# Patient Record
Sex: FEMALE | Race: WHITE | Employment: FULL TIME | URBAN - METROPOLITAN AREA
[De-identification: names, ages, dates, MRNs, and addresses within clinical notes are randomized per-mention and may not be internally consistent; named-entity substitution may affect disease eponyms.]

---

## 2017-01-02 NOTE — TELEPHONE ENCOUNTER
LOV  11/12/2016    Last refill    lisinopril 20 MG Oral Tab 90 tablet 0 9/1/2016       Sig :  Take 1 tablet (20 mg total) by mouth daily.       Route:   Oral       Medication pending. Please advise. No future appointments.

## 2017-01-03 RX ORDER — LISINOPRIL 20 MG/1
TABLET ORAL
Qty: 30 TABLET | Refills: 0 | Status: SHIPPED | OUTPATIENT
Start: 2017-01-03 | End: 2017-03-25

## 2017-01-10 ENCOUNTER — MED REC SCAN ONLY (OUTPATIENT)
Dept: FAMILY MEDICINE CLINIC | Facility: CLINIC | Age: 42
End: 2017-01-10

## 2017-02-03 RX ORDER — LISINOPRIL 20 MG/1
TABLET ORAL
Qty: 30 TABLET | Refills: 0 | Status: SHIPPED | OUTPATIENT
Start: 2017-02-03 | End: 2017-03-25

## 2017-02-03 NOTE — TELEPHONE ENCOUNTER
LOV: 11/12/16 for ADHD  BP at time of visit: 98/60      LISINOPRIL 20 MG Oral Tab 30 tablet 0 1/3/2017      Sig :  TAKE 1 TABLET(20 MG) BY MOUTH DAILY       Route:   (none)       No future appointments. Please advise.

## 2017-02-22 ENCOUNTER — MED REC SCAN ONLY (OUTPATIENT)
Dept: FAMILY MEDICINE CLINIC | Facility: CLINIC | Age: 42
End: 2017-02-22

## 2017-03-25 ENCOUNTER — MED REC SCAN ONLY (OUTPATIENT)
Dept: FAMILY MEDICINE CLINIC | Facility: CLINIC | Age: 42
End: 2017-03-25

## 2017-03-25 ENCOUNTER — OFFICE VISIT (OUTPATIENT)
Dept: FAMILY MEDICINE CLINIC | Facility: CLINIC | Age: 42
End: 2017-03-25

## 2017-03-25 VITALS
WEIGHT: 293 LBS | SYSTOLIC BLOOD PRESSURE: 108 MMHG | RESPIRATION RATE: 20 BRPM | TEMPERATURE: 98 F | BODY MASS INDEX: 53 KG/M2 | DIASTOLIC BLOOD PRESSURE: 62 MMHG | HEART RATE: 111 BPM

## 2017-03-25 DIAGNOSIS — M54.42 CHRONIC LEFT-SIDED LOW BACK PAIN WITH LEFT-SIDED SCIATICA: Primary | ICD-10-CM

## 2017-03-25 DIAGNOSIS — I10 ESSENTIAL HYPERTENSION: ICD-10-CM

## 2017-03-25 DIAGNOSIS — G89.29 CHRONIC LEFT-SIDED LOW BACK PAIN WITH LEFT-SIDED SCIATICA: Primary | ICD-10-CM

## 2017-03-25 LAB
BUN BLD-MCNC: 11 MG/DL (ref 8–20)
CALCIUM BLD-MCNC: 9.4 MG/DL (ref 8.3–10.3)
CHLORIDE: 106 MMOL/L (ref 101–111)
CO2: 24 MMOL/L (ref 22–32)
CREAT BLD-MCNC: 0.75 MG/DL (ref 0.55–1.02)
GLUCOSE BLD-MCNC: 127 MG/DL (ref 70–99)
POTASSIUM SERPL-SCNC: 4.2 MMOL/L (ref 3.6–5.1)
SODIUM SERPL-SCNC: 138 MMOL/L (ref 136–144)

## 2017-03-25 PROCEDURE — 99214 OFFICE O/P EST MOD 30 MIN: CPT | Performed by: FAMILY MEDICINE

## 2017-03-25 PROCEDURE — 80048 BASIC METABOLIC PNL TOTAL CA: CPT | Performed by: FAMILY MEDICINE

## 2017-03-25 RX ORDER — LISINOPRIL 20 MG/1
TABLET ORAL
Qty: 90 TABLET | Refills: 1 | Status: SHIPPED | OUTPATIENT
Start: 2017-03-25 | End: 2017-03-27

## 2017-03-25 RX ORDER — IBUPROFEN 800 MG/1
TABLET ORAL
Qty: 90 TABLET | Refills: 0 | Status: SHIPPED | OUTPATIENT
Start: 2017-03-25 | End: 2017-03-27

## 2017-03-25 NOTE — PROGRESS NOTES
CC: meds check    HPI:     HTN:   Quality essential  Severity moderate  Duration chronic  Modifying factors lisinopril controlling    Back pain:   Location lower, left  Quality aching, sharp, sciatic  Severity mdoerate  Duration chronic  Modifying factors active. Encouraged TLC for weight loss. Prn ibuprofen. No orders of the defined types were placed in this encounter.        Meds & Refills for this Visit:  Signed Prescriptions Disp Refills    lisinopril 20 MG Oral Tab 90 tablet 1      Sig: TAKE 1 TAB

## 2017-05-05 ENCOUNTER — OFFICE VISIT (OUTPATIENT)
Dept: FAMILY MEDICINE CLINIC | Facility: CLINIC | Age: 42
End: 2017-05-05

## 2017-05-05 VITALS
HEART RATE: 104 BPM | SYSTOLIC BLOOD PRESSURE: 118 MMHG | WEIGHT: 293 LBS | TEMPERATURE: 99 F | RESPIRATION RATE: 20 BRPM | HEIGHT: 64 IN | BODY MASS INDEX: 50.02 KG/M2 | DIASTOLIC BLOOD PRESSURE: 64 MMHG

## 2017-05-05 DIAGNOSIS — H61.23 BILATERAL IMPACTED CERUMEN: Primary | ICD-10-CM

## 2017-05-05 PROCEDURE — 99213 OFFICE O/P EST LOW 20 MIN: CPT | Performed by: FAMILY MEDICINE

## 2017-05-05 NOTE — PROGRESS NOTES
CC: hearing change    HPI:     Left ear blocked.      ROS:; no otorrhea    EXAM:   /64 mmHg  Pulse 104  Temp(Src) 98.9 °F (37.2 °C) (Temporal)  Resp 20  Ht 64\"  Wt 313 lb  BMI 53.70 kg/m2  LMP 04/19/2017 (Approximate)    ENT: tms impacted bilaterally

## 2017-05-06 ENCOUNTER — NURSE ONLY (OUTPATIENT)
Dept: FAMILY MEDICINE CLINIC | Facility: CLINIC | Age: 42
End: 2017-05-06

## 2017-10-13 NOTE — TELEPHONE ENCOUNTER
LOV: 5/5/17 for bilateral impacted cerumen  /64 mmHg     lisinopril 20 MG Oral Tab 90 tablet 1 3/27/2017    Sig :  TAKE 1 TABLET(20 MG) BY MOUTH DAILY     Route:   (none)     Cosign for Ordering:   Accepted by Carin Dubin, DO on 3/31/2017  1:51 PM

## 2017-10-13 NOTE — TELEPHONE ENCOUNTER
From: Silvio Bonilla  Sent: 10/13/2017 4:06 PM CDT  Subject: Medication Renewal Request    Corinne Alvarez would like a refill of the following medications:     lisinopril 20 MG Oral Tab Norberto Chacon DO]   Patient Comment: I am almost out of this m

## 2017-10-16 ENCOUNTER — PATIENT MESSAGE (OUTPATIENT)
Dept: FAMILY MEDICINE CLINIC | Facility: CLINIC | Age: 42
End: 2017-10-16

## 2017-10-16 DIAGNOSIS — Z00.00 GENERAL MEDICAL EXAM: Primary | ICD-10-CM

## 2017-10-16 RX ORDER — LISINOPRIL 20 MG/1
TABLET ORAL
Qty: 90 TABLET | Refills: 1 | Status: SHIPPED
Start: 2017-10-16 | End: 2017-12-26

## 2017-10-16 RX ORDER — IBUPROFEN 800 MG/1
TABLET ORAL
Qty: 90 TABLET | Refills: 0 | Status: SHIPPED
Start: 2017-10-16 | End: 2017-12-26

## 2017-10-16 NOTE — TELEPHONE ENCOUNTER
From: Justus Sultana  To: Ivy Feldman DO  Sent: 10/16/2017 2:33 PM CDT  Subject: Other    I would like to schedule an annual physical for myself and I want to do full blood work to see how things are going as compared to past years.  I would like to

## 2017-11-13 ENCOUNTER — OFFICE VISIT (OUTPATIENT)
Dept: FAMILY MEDICINE CLINIC | Facility: CLINIC | Age: 42
End: 2017-11-13

## 2017-11-13 VITALS
RESPIRATION RATE: 24 BRPM | HEIGHT: 63 IN | WEIGHT: 293 LBS | SYSTOLIC BLOOD PRESSURE: 128 MMHG | TEMPERATURE: 98 F | DIASTOLIC BLOOD PRESSURE: 78 MMHG | HEART RATE: 104 BPM | BODY MASS INDEX: 51.91 KG/M2

## 2017-11-13 DIAGNOSIS — Z00.00 GENERAL MEDICAL EXAM: Primary | ICD-10-CM

## 2017-11-13 PROCEDURE — 99396 PREV VISIT EST AGE 40-64: CPT | Performed by: FAMILY MEDICINE

## 2017-11-13 RX ORDER — METRONIDAZOLE 10 MG/G
GEL TOPICAL
Qty: 60 G | Refills: 5 | Status: SHIPPED | OUTPATIENT
Start: 2017-11-13 | End: 2017-12-26

## 2017-11-14 NOTE — PROGRESS NOTES
HPI:   Amor Rodriguez is a 40year old female who presents for a complete physical exam. Sees gynecology for all breast and  care. There is no immunization history on file for this patient.   Wt Readings from Last 6 Encounters:  11/13/17 : Ihsaan Serna HYPERTENSION    • Other malaise and fatigue 1/10/2011   • Other symptoms referable to back 1/3/2011   • Premenstrual tension syndromes 5/9/2011   • Sleep apnea    • Sprain of lumbar region 1/3/2011   • Unspecified essential hypertension     in pregnancy tenderness  EXTREMITIES: no cyanosis, clubbing or edema    ASSESSMENT AND PLAN:   Sandra Olivia is a 40year old female who presents for a complete physical exam. Pt' s weight is Body mass index is 57.57 kg/m². , recommended low sugar diet and aerobic

## 2017-12-26 ENCOUNTER — OFFICE VISIT (OUTPATIENT)
Dept: FAMILY MEDICINE CLINIC | Facility: CLINIC | Age: 42
End: 2017-12-26

## 2017-12-26 VITALS
BODY MASS INDEX: 57 KG/M2 | WEIGHT: 293 LBS | RESPIRATION RATE: 20 BRPM | TEMPERATURE: 98 F | HEART RATE: 107 BPM | DIASTOLIC BLOOD PRESSURE: 80 MMHG | OXYGEN SATURATION: 98 % | SYSTOLIC BLOOD PRESSURE: 118 MMHG

## 2017-12-26 DIAGNOSIS — J02.9 SORE THROAT: Primary | ICD-10-CM

## 2017-12-26 PROCEDURE — 87880 STREP A ASSAY W/OPTIC: CPT | Performed by: FAMILY MEDICINE

## 2017-12-26 PROCEDURE — 99213 OFFICE O/P EST LOW 20 MIN: CPT | Performed by: FAMILY MEDICINE

## 2017-12-26 PROCEDURE — 87081 CULTURE SCREEN ONLY: CPT | Performed by: FAMILY MEDICINE

## 2017-12-26 RX ORDER — METRONIDAZOLE 10 MG/G
GEL TOPICAL
Refills: 5 | COMMUNITY
Start: 2017-11-13 | End: 2019-03-12

## 2017-12-26 RX ORDER — LISINOPRIL 20 MG/1
TABLET ORAL
Refills: 1 | COMMUNITY
Start: 2017-10-19 | End: 2018-04-28

## 2017-12-26 RX ORDER — IBUPROFEN 800 MG/1
TABLET ORAL
Refills: 0 | COMMUNITY
Start: 2017-10-19 | End: 2018-04-30

## 2017-12-26 NOTE — PROGRESS NOTES
CC: sore throat    HPI:     Sore throat 1-2 days.      ROS: no fever, some cough      EXAM:   /80   Pulse 107   Temp 97.9 °F (36.6 °C) (Temporal)   Resp 20   Wt (!) 322 lb   LMP 12/26/2017   SpO2 98%   BMI 57.04 kg/m²     H: RRR  L: CTA jessee  ENT; thro

## 2018-01-08 ENCOUNTER — TELEPHONE (OUTPATIENT)
Dept: FAMILY MEDICINE CLINIC | Facility: CLINIC | Age: 43
End: 2018-01-08

## 2018-01-08 NOTE — TELEPHONE ENCOUNTER
Patient advised. Appointment scheduled.   Future Appointments  Date Time Provider Lena Garcia   1/9/2018 10:40 AM DO MAMTA Alonso [facility]

## 2018-01-08 NOTE — TELEPHONE ENCOUNTER
Patient seen 12/26/17 for sore throat. Strep culture came back negative  Patient's symptoms started after rachael. Patient states she was feeling better but on Saturday patient felt worse.   Patient has been having a low grade fever (99.9) - patient pema

## 2018-01-08 NOTE — TELEPHONE ENCOUNTER
Pt was seen after xmas for cold and strep,  now has a low grade fever,  chills, congestion, sore throat, yellow mucus,  Wants to know if she can get antibiotic called in or does she have to be seen. Schedule is full.

## 2018-01-09 ENCOUNTER — OFFICE VISIT (OUTPATIENT)
Dept: FAMILY MEDICINE CLINIC | Facility: CLINIC | Age: 43
End: 2018-01-09

## 2018-01-09 VITALS
HEART RATE: 111 BPM | WEIGHT: 293 LBS | RESPIRATION RATE: 24 BRPM | BODY MASS INDEX: 55 KG/M2 | SYSTOLIC BLOOD PRESSURE: 128 MMHG | OXYGEN SATURATION: 98 % | TEMPERATURE: 99 F | DIASTOLIC BLOOD PRESSURE: 78 MMHG

## 2018-01-09 DIAGNOSIS — J01.10 ACUTE NON-RECURRENT FRONTAL SINUSITIS: Primary | ICD-10-CM

## 2018-01-09 PROCEDURE — 99214 OFFICE O/P EST MOD 30 MIN: CPT | Performed by: FAMILY MEDICINE

## 2018-01-09 RX ORDER — AZITHROMYCIN 250 MG/1
TABLET, FILM COATED ORAL
Qty: 6 TABLET | Refills: 0 | Status: SHIPPED | OUTPATIENT
Start: 2018-01-09 | End: 2018-04-28

## 2018-02-05 PROCEDURE — 88175 CYTOPATH C/V AUTO FLUID REDO: CPT | Performed by: OBSTETRICS & GYNECOLOGY

## 2018-02-05 PROCEDURE — 87624 HPV HI-RISK TYP POOLED RSLT: CPT | Performed by: OBSTETRICS & GYNECOLOGY

## 2018-02-08 ENCOUNTER — PATIENT MESSAGE (OUTPATIENT)
Dept: FAMILY MEDICINE CLINIC | Facility: CLINIC | Age: 43
End: 2018-02-08

## 2018-02-08 RX ORDER — AMLODIPINE BESYLATE 5 MG/1
5 TABLET ORAL DAILY
Qty: 30 TABLET | Refills: 0 | Status: SHIPPED | OUTPATIENT
Start: 2018-02-08 | End: 2018-03-24

## 2018-02-08 NOTE — TELEPHONE ENCOUNTER
From: Mildred Stanley  To: Marissa Beal DO  Sent: 2/8/2018 10:33 AM CST  Subject: Prescription Question    Dr. Daniel Evans,    I wanted to find out if there is a different blood pressure medication you could prescribe for me.  I am not taking my lisinopril d

## 2018-03-24 ENCOUNTER — TELEPHONE (OUTPATIENT)
Dept: FAMILY MEDICINE CLINIC | Facility: CLINIC | Age: 43
End: 2018-03-24

## 2018-03-24 RX ORDER — AMLODIPINE BESYLATE 5 MG/1
5 TABLET ORAL DAILY
Qty: 30 TABLET | Refills: 0 | Status: SHIPPED | OUTPATIENT
Start: 2018-03-24 | End: 2018-04-28

## 2018-03-24 NOTE — TELEPHONE ENCOUNTER
Called patient to reschedule appointment with Dr. Fan Zamarripa on 03/24/2018.   Patient states needs a refill on blood pressure meds before next appointment on 04/28/2018    LOV -  01/09/2018 sinuisitis  LRF- Amlodipine 02/08/2018 #30 RF0  Future Appointments  D

## 2018-04-28 ENCOUNTER — OFFICE VISIT (OUTPATIENT)
Dept: FAMILY MEDICINE CLINIC | Facility: CLINIC | Age: 43
End: 2018-04-28

## 2018-04-28 VITALS
SYSTOLIC BLOOD PRESSURE: 124 MMHG | BODY MASS INDEX: 51.91 KG/M2 | WEIGHT: 293 LBS | HEART RATE: 82 BPM | HEIGHT: 63 IN | DIASTOLIC BLOOD PRESSURE: 84 MMHG | OXYGEN SATURATION: 98 % | TEMPERATURE: 98 F | RESPIRATION RATE: 18 BRPM

## 2018-04-28 DIAGNOSIS — I10 ESSENTIAL HYPERTENSION: Primary | ICD-10-CM

## 2018-04-28 PROCEDURE — 99213 OFFICE O/P EST LOW 20 MIN: CPT | Performed by: FAMILY MEDICINE

## 2018-04-28 RX ORDER — AMLODIPINE BESYLATE 5 MG/1
5 TABLET ORAL DAILY
Qty: 90 TABLET | Refills: 1 | Status: SHIPPED | OUTPATIENT
Start: 2018-04-28 | End: 2019-03-12

## 2018-04-28 NOTE — PROGRESS NOTES
CC: bp check    HPI:    bp stable. Compliant with amlodipine.      ROS; no cp or sob, some edema but resolved to baseline    EXAM: /84   Pulse 82   Temp 97.9 °F (36.6 °C) (Temporal)   Resp 18   Ht 63\"   Wt (!) 322 lb   LMP 04/11/2018   SpO2 98%   BMI

## 2018-04-30 ENCOUNTER — PATIENT MESSAGE (OUTPATIENT)
Dept: FAMILY MEDICINE CLINIC | Facility: CLINIC | Age: 43
End: 2018-04-30

## 2018-04-30 NOTE — TELEPHONE ENCOUNTER
From: Teodoro Randhawa  To: Jesusita Burroughs DO  Sent: 4/30/2018 4:38 PM CDT  Subject: Prescription Question    I see that the Amoldipine was sent over to my mail order service, however I don't see that the prescription refill for ibuprofen was sent.  Can you

## 2018-05-01 RX ORDER — IBUPROFEN 800 MG/1
TABLET ORAL
Qty: 90 TABLET | Refills: 0 | Status: SHIPPED | OUTPATIENT
Start: 2018-05-01 | End: 2018-05-02

## 2018-05-02 ENCOUNTER — TELEPHONE (OUTPATIENT)
Dept: FAMILY MEDICINE CLINIC | Facility: CLINIC | Age: 43
End: 2018-05-02

## 2018-05-02 ENCOUNTER — PATIENT MESSAGE (OUTPATIENT)
Dept: FAMILY MEDICINE CLINIC | Facility: CLINIC | Age: 43
End: 2018-05-02

## 2018-05-02 RX ORDER — IBUPROFEN 800 MG/1
TABLET ORAL
Qty: 90 TABLET | Refills: 0 | Status: SHIPPED | OUTPATIENT
Start: 2018-05-02 | End: 2018-05-02

## 2018-05-02 NOTE — TELEPHONE ENCOUNTER
[address] [facility] states pt needs new RX for  ibuprofen 800 MG Oral Tab 90 tablet 0 5/2/2018    Sig :  TK 1 T PO TID PRN P       Due to pt's insurance covers 60 days minimum. FYI  Medication was prescribe today. Please advise.

## 2018-05-02 NOTE — TELEPHONE ENCOUNTER
From: Ambika Spears  To: Neil Palma DO  Sent: 5/2/2018 9:06 AM CDT  Subject: Prescription Question    The ibuprofen refill was sent to [facility] on 5/1/18 but should have been sent to [address] [place] [address] as this is my new mail order service.  [facility] is no long

## 2018-05-03 RX ORDER — IBUPROFEN 800 MG/1
TABLET ORAL
Qty: 180 TABLET | Refills: 0 | Status: SHIPPED | OUTPATIENT
Start: 2018-05-03 | End: 2019-09-16

## 2018-08-06 ENCOUNTER — OFFICE VISIT (OUTPATIENT)
Dept: FAMILY MEDICINE CLINIC | Facility: CLINIC | Age: 43
End: 2018-08-06
Payer: COMMERCIAL

## 2018-08-06 VITALS
BODY MASS INDEX: 51.91 KG/M2 | WEIGHT: 293 LBS | HEIGHT: 63 IN | DIASTOLIC BLOOD PRESSURE: 86 MMHG | OXYGEN SATURATION: 96 % | HEART RATE: 94 BPM | TEMPERATURE: 99 F | SYSTOLIC BLOOD PRESSURE: 124 MMHG | RESPIRATION RATE: 18 BRPM

## 2018-08-06 DIAGNOSIS — J01.00 ACUTE NON-RECURRENT MAXILLARY SINUSITIS: Primary | ICD-10-CM

## 2018-08-06 PROCEDURE — 99214 OFFICE O/P EST MOD 30 MIN: CPT | Performed by: FAMILY MEDICINE

## 2018-08-06 RX ORDER — AZITHROMYCIN 250 MG/1
TABLET, FILM COATED ORAL
Qty: 6 TABLET | Refills: 0 | Status: SHIPPED | OUTPATIENT
Start: 2018-08-06 | End: 2019-01-10

## 2018-08-06 NOTE — PROGRESS NOTES
CC: congestion    HPI:     Location chest, nasal  Quality pressure, drainage  Severity moderate  Duration couple weeks  Associated symptoms some cough    ROS:   Pos post nasal drip, no vomiting or diarrhea, no rash    Past Medical History:   Diagnosis Date Consults:  None

## 2018-08-13 NOTE — PROGRESS NOTES
CC: congestion    HPI:     Location frontal  Quality pressure, drainage  Severity moderate  Duration several days   Timing frequent  Context had sore throat prior, then better a few days, then fever and above symptoms started    ROS: :some cough, pos tooth Called cell#, sounded like someone answered but line went dead.    Called cell#, is feeling like \"something is there\" - having difficulty starting urinary stream at night, but having the feeling of going. Also was having burning at urethra. Continued through the weekend. Is sensitive to  Bactrim DS, original prescription that  was going to prescribe. Sent to pharmacy. Only allergy on file is Macrobid.   Imaging & Consults:  None

## 2019-01-10 ENCOUNTER — OFFICE VISIT (OUTPATIENT)
Dept: FAMILY MEDICINE CLINIC | Facility: CLINIC | Age: 44
End: 2019-01-10
Payer: COMMERCIAL

## 2019-01-10 VITALS
SYSTOLIC BLOOD PRESSURE: 132 MMHG | BODY MASS INDEX: 51.91 KG/M2 | HEART RATE: 88 BPM | WEIGHT: 293 LBS | HEIGHT: 63 IN | TEMPERATURE: 99 F | OXYGEN SATURATION: 95 % | DIASTOLIC BLOOD PRESSURE: 86 MMHG | RESPIRATION RATE: 20 BRPM

## 2019-01-10 DIAGNOSIS — I10 ESSENTIAL HYPERTENSION: ICD-10-CM

## 2019-01-10 DIAGNOSIS — J01.01 ACUTE RECURRENT MAXILLARY SINUSITIS: Primary | ICD-10-CM

## 2019-01-10 PROCEDURE — 99214 OFFICE O/P EST MOD 30 MIN: CPT | Performed by: FAMILY MEDICINE

## 2019-01-10 RX ORDER — AZITHROMYCIN 250 MG/1
TABLET, FILM COATED ORAL
Qty: 6 TABLET | Refills: 0 | Status: SHIPPED | OUTPATIENT
Start: 2019-01-10 | End: 2019-03-12

## 2019-01-10 NOTE — PROGRESS NOTES
CC: congestion    HPI:    Location nasal, sinus  Quality pressure, drainage  Severity moderate  Duration several days    Htn: chronic, stable. She went off amlodipine several weeks ago, citing \"wheezing every time I took it\".      ROS:   GENERAL HEALTH: f better following treatment, return for re-evaluation. 2. Essential hypertension  Follow up rn visit in 2 weeks for bp check and ear flush. Consider other agent. No orders of the defined types were placed in this encounter.       Meds & Refills fo

## 2019-01-26 ENCOUNTER — NURSE ONLY (OUTPATIENT)
Dept: FAMILY MEDICINE CLINIC | Facility: CLINIC | Age: 44
End: 2019-01-26
Payer: COMMERCIAL

## 2019-01-26 VITALS — DIASTOLIC BLOOD PRESSURE: 88 MMHG | SYSTOLIC BLOOD PRESSURE: 132 MMHG

## 2019-02-09 ENCOUNTER — NURSE ONLY (OUTPATIENT)
Dept: FAMILY MEDICINE CLINIC | Facility: CLINIC | Age: 44
End: 2019-02-09
Payer: COMMERCIAL

## 2019-02-09 VITALS — HEART RATE: 86 BPM | DIASTOLIC BLOOD PRESSURE: 83 MMHG | SYSTOLIC BLOOD PRESSURE: 137 MMHG

## 2019-03-12 PROCEDURE — 87624 HPV HI-RISK TYP POOLED RSLT: CPT | Performed by: OBSTETRICS & GYNECOLOGY

## 2019-03-12 PROCEDURE — 88175 CYTOPATH C/V AUTO FLUID REDO: CPT | Performed by: OBSTETRICS & GYNECOLOGY

## 2019-03-23 ENCOUNTER — LAB ENCOUNTER (OUTPATIENT)
Dept: LAB | Age: 44
End: 2019-03-23
Attending: FAMILY MEDICINE
Payer: COMMERCIAL

## 2019-03-23 DIAGNOSIS — Z13.21 ENCOUNTER FOR VITAMIN DEFICIENCY SCREENING: ICD-10-CM

## 2019-03-23 DIAGNOSIS — Z01.419 WELL WOMAN EXAM WITH ROUTINE GYNECOLOGICAL EXAM: ICD-10-CM

## 2019-03-23 DIAGNOSIS — Z13.220 SCREENING CHOLESTEROL LEVEL: ICD-10-CM

## 2019-03-23 DIAGNOSIS — N92.6 IRREGULAR BLEEDING: ICD-10-CM

## 2019-03-23 LAB
ALBUMIN SERPL-MCNC: 3.5 G/DL (ref 3.4–5)
ALBUMIN/GLOB SERPL: 0.9 {RATIO} (ref 1–2)
ALP LIVER SERPL-CCNC: 74 U/L (ref 37–98)
ALT SERPL-CCNC: 29 U/L (ref 13–56)
ANION GAP SERPL CALC-SCNC: 7 MMOL/L (ref 0–18)
AST SERPL-CCNC: 15 U/L (ref 15–37)
BASOPHILS # BLD AUTO: 0.04 X10(3) UL (ref 0–0.2)
BASOPHILS NFR BLD AUTO: 0.6 %
BILIRUB SERPL-MCNC: 0.4 MG/DL (ref 0.1–2)
BUN BLD-MCNC: 12 MG/DL (ref 7–18)
BUN/CREAT SERPL: 15.2 (ref 10–20)
CALCIUM BLD-MCNC: 8.7 MG/DL (ref 8.5–10.1)
CHLORIDE SERPL-SCNC: 110 MMOL/L (ref 98–107)
CHOLEST SMN-MCNC: 128 MG/DL (ref ?–200)
CO2 SERPL-SCNC: 25 MMOL/L (ref 21–32)
CREAT BLD-MCNC: 0.79 MG/DL (ref 0.55–1.02)
DEPRECATED RDW RBC AUTO: 48 FL (ref 35.1–46.3)
EOSINOPHIL # BLD AUTO: 0.21 X10(3) UL (ref 0–0.7)
EOSINOPHIL NFR BLD AUTO: 3 %
ERYTHROCYTE [DISTWIDTH] IN BLOOD BY AUTOMATED COUNT: 16.2 % (ref 11–15)
GLOBULIN PLAS-MCNC: 4 G/DL (ref 2.8–4.4)
GLUCOSE BLD-MCNC: 91 MG/DL (ref 70–99)
HCT VFR BLD AUTO: 38.3 % (ref 35–48)
HDLC SERPL-MCNC: 44 MG/DL (ref 40–59)
HGB BLD-MCNC: 11.4 G/DL (ref 12–16)
IMM GRANULOCYTES # BLD AUTO: 0.03 X10(3) UL (ref 0–1)
IMM GRANULOCYTES NFR BLD: 0.4 %
LDLC SERPL CALC-MCNC: 67 MG/DL (ref ?–100)
LYMPHOCYTES # BLD AUTO: 2.28 X10(3) UL (ref 1–4)
LYMPHOCYTES NFR BLD AUTO: 32.7 %
M PROTEIN MFR SERPL ELPH: 7.5 G/DL (ref 6.4–8.2)
MCH RBC QN AUTO: 24.4 PG (ref 26–34)
MCHC RBC AUTO-ENTMCNC: 29.8 G/DL (ref 31–37)
MCV RBC AUTO: 82 FL (ref 80–100)
MONOCYTES # BLD AUTO: 0.54 X10(3) UL (ref 0.1–1)
MONOCYTES NFR BLD AUTO: 7.7 %
NEUTROPHILS # BLD AUTO: 3.87 X10 (3) UL (ref 1.5–7.7)
NEUTROPHILS # BLD AUTO: 3.87 X10(3) UL (ref 1.5–7.7)
NEUTROPHILS NFR BLD AUTO: 55.6 %
NONHDLC SERPL-MCNC: 84 MG/DL (ref ?–130)
OSMOLALITY SERPL CALC.SUM OF ELEC: 293 MOSM/KG (ref 275–295)
PLATELET # BLD AUTO: 288 10(3)UL (ref 150–450)
POTASSIUM SERPL-SCNC: 4.4 MMOL/L (ref 3.5–5.1)
RBC # BLD AUTO: 4.67 X10(6)UL (ref 3.8–5.3)
SODIUM SERPL-SCNC: 142 MMOL/L (ref 136–145)
T3FREE SERPL-MCNC: 2.74 PG/ML (ref 2.4–4.2)
T4 FREE SERPL-MCNC: 1.2 NG/DL (ref 0.8–1.7)
TRIGL SERPL-MCNC: 86 MG/DL (ref 30–149)
TSI SER-ACNC: 3.14 MIU/ML (ref 0.36–3.74)
VIT D+METAB SERPL-MCNC: 17.3 NG/ML (ref 30–100)
VLDLC SERPL CALC-MCNC: 17 MG/DL (ref 0–30)
WBC # BLD AUTO: 7 X10(3) UL (ref 4–11)

## 2019-03-23 PROCEDURE — 80053 COMPREHEN METABOLIC PANEL: CPT

## 2019-03-23 PROCEDURE — 82306 VITAMIN D 25 HYDROXY: CPT

## 2019-03-23 PROCEDURE — 85025 COMPLETE CBC W/AUTO DIFF WBC: CPT

## 2019-03-23 PROCEDURE — 36415 COLL VENOUS BLD VENIPUNCTURE: CPT

## 2019-03-23 PROCEDURE — 84443 ASSAY THYROID STIM HORMONE: CPT

## 2019-03-23 PROCEDURE — 84439 ASSAY OF FREE THYROXINE: CPT

## 2019-03-23 PROCEDURE — 80061 LIPID PANEL: CPT

## 2019-03-23 PROCEDURE — 84481 FREE ASSAY (FT-3): CPT

## 2019-04-05 ENCOUNTER — OFFICE VISIT (OUTPATIENT)
Dept: FAMILY MEDICINE CLINIC | Facility: CLINIC | Age: 44
End: 2019-04-05
Payer: COMMERCIAL

## 2019-04-05 VITALS
SYSTOLIC BLOOD PRESSURE: 137 MMHG | DIASTOLIC BLOOD PRESSURE: 81 MMHG | HEART RATE: 85 BPM | TEMPERATURE: 98 F | RESPIRATION RATE: 18 BRPM | BODY MASS INDEX: 56 KG/M2 | WEIGHT: 293 LBS | OXYGEN SATURATION: 97 %

## 2019-04-05 DIAGNOSIS — J40 BRONCHITIS: Primary | ICD-10-CM

## 2019-04-05 DIAGNOSIS — J98.01 BRONCHOSPASM: ICD-10-CM

## 2019-04-05 PROCEDURE — 99214 OFFICE O/P EST MOD 30 MIN: CPT | Performed by: FAMILY MEDICINE

## 2019-04-05 RX ORDER — ALBUTEROL SULFATE 90 UG/1
2 AEROSOL, METERED RESPIRATORY (INHALATION) EVERY 4 HOURS PRN
Qty: 1 INHALER | Refills: 0 | Status: SHIPPED | OUTPATIENT
Start: 2019-04-05 | End: 2021-09-10

## 2019-04-05 RX ORDER — METHYLPREDNISOLONE 4 MG/1
TABLET ORAL
Qty: 1 KIT | Refills: 0 | Status: SHIPPED | OUTPATIENT
Start: 2019-04-05 | End: 2019-04-25

## 2019-04-05 RX ORDER — PROMETHAZINE HYDROCHLORIDE AND CODEINE PHOSPHATE 6.25; 1 MG/5ML; MG/5ML
5 SYRUP ORAL EVERY 6 HOURS PRN
Qty: 120 ML | Refills: 0 | Status: SHIPPED | OUTPATIENT
Start: 2019-04-05 | End: 2019-05-17 | Stop reason: ALTCHOICE

## 2019-04-05 NOTE — PROGRESS NOTES
CC: congestion    HPI:     Location chest   Quality deep  Severity moderate  Duration more than a week  Timing frequent  Context \"started out as a cold that I cant seem to get rid of\"  Associated symptoms no fever    ROS: no fever or chills, no vomiting the lungs every 4 (four) hours as needed. • methylPREDNISolone (MEDROL) 4 MG Oral Tablet Therapy Pack 1 kit 0     Sig: As directed. • promethazine-codeine 6.25-10 MG/5ML Oral Syrup 120 mL 0     Sig: Take 5 mL by mouth every 6 (six) hours as needed.

## 2019-04-11 ENCOUNTER — PATIENT MESSAGE (OUTPATIENT)
Dept: FAMILY MEDICINE CLINIC | Facility: CLINIC | Age: 44
End: 2019-04-11

## 2019-04-12 NOTE — TELEPHONE ENCOUNTER
From: Stewart Jackman  To: Valeria Givens DO  Sent: 4/11/2019 10:51 PM CDT  Subject: Visit Follow-up Question    Dr. Sheyla Hess,    I finished taking the steroids today and I still can't seem to get this cough to stop.  I have been taking the cough medicine a

## 2019-04-25 ENCOUNTER — OFFICE VISIT (OUTPATIENT)
Dept: FAMILY MEDICINE CLINIC | Facility: CLINIC | Age: 44
End: 2019-04-25
Payer: COMMERCIAL

## 2019-04-25 ENCOUNTER — HOSPITAL ENCOUNTER (OUTPATIENT)
Dept: GENERAL RADIOLOGY | Age: 44
Discharge: HOME | End: 2019-04-25
Attending: FAMILY MEDICINE
Payer: COMMERCIAL

## 2019-04-25 VITALS
TEMPERATURE: 98 F | SYSTOLIC BLOOD PRESSURE: 124 MMHG | BODY MASS INDEX: 51.91 KG/M2 | HEART RATE: 100 BPM | OXYGEN SATURATION: 98 % | RESPIRATION RATE: 18 BRPM | DIASTOLIC BLOOD PRESSURE: 86 MMHG | WEIGHT: 293 LBS | HEIGHT: 63 IN

## 2019-04-25 DIAGNOSIS — J98.01 BRONCHOSPASM: Primary | ICD-10-CM

## 2019-04-25 DIAGNOSIS — J98.01 BRONCHOSPASM: ICD-10-CM

## 2019-04-25 PROCEDURE — 71046 X-RAY EXAM CHEST 2 VIEWS: CPT | Performed by: FAMILY MEDICINE

## 2019-04-25 PROCEDURE — 99214 OFFICE O/P EST MOD 30 MIN: CPT | Performed by: FAMILY MEDICINE

## 2019-04-25 RX ORDER — PREDNISONE 10 MG/1
TABLET ORAL
Qty: 32 TABLET | Refills: 0 | Status: SHIPPED | OUTPATIENT
Start: 2019-04-25 | End: 2019-05-17 | Stop reason: ALTCHOICE

## 2019-04-30 NOTE — PROGRESS NOTES
CC: cough    HPI:     Location chest  Quality deep  Severity moderate  Duration several weeks  Timing constant  ROS: no sputum, some pleuritic pains, no fever    Past Medical History:   Diagnosis Date   • Abnormal Pap smear of cervix    • Acute bronchitis

## 2019-05-06 ENCOUNTER — TELEPHONE (OUTPATIENT)
Dept: FAMILY MEDICINE CLINIC | Facility: CLINIC | Age: 44
End: 2019-05-06

## 2019-05-06 NOTE — TELEPHONE ENCOUNTER
Explained x-ray order from out office is not usually billed as an emergency  Patient verbalized understanding.

## 2019-05-06 NOTE — TELEPHONE ENCOUNTER
PT. HAS SOME QUESTIONS RE: LAST TIME SHE WAS IN SHE WAS SENT OVER TO URGENT CARE FOR XRAYS AND THIS IS GOING AGAINST HER DED. BECAUSE OF EMERGENCY XRAY? ?

## 2019-05-13 ENCOUNTER — PATIENT MESSAGE (OUTPATIENT)
Dept: FAMILY MEDICINE CLINIC | Facility: CLINIC | Age: 44
End: 2019-05-13

## 2019-05-13 NOTE — TELEPHONE ENCOUNTER
From: Lizzy Innocent  To:  Gaurang Payment, DO  Sent: 5/13/2019 9:49 AM CDT  Subject: Visit Follow-up Question    Dr Fernandes Primes,    I finished taking the steroids that were prescribed to me on 4/26 and while they helped while I was taking them, now that they a

## 2019-09-16 RX ORDER — IBUPROFEN 800 MG/1
TABLET ORAL
Qty: 180 TABLET | Refills: 0 | Status: SHIPPED | OUTPATIENT
Start: 2019-09-16 | End: 2020-08-31

## 2019-09-16 NOTE — TELEPHONE ENCOUNTER
Last refilled on 5/3/18 for # 180 with 0 refills  Last OV 4/25/19  No future appointments. Thank you.        ibuprofen 800 MG Oral Tab Chito Garland, DO]       Patient Comment: I use these for pain management when I have a flare up related to my back.

## 2020-02-28 ENCOUNTER — OFFICE VISIT (OUTPATIENT)
Dept: FAMILY MEDICINE CLINIC | Facility: CLINIC | Age: 45
End: 2020-02-28
Payer: COMMERCIAL

## 2020-02-28 VITALS
HEART RATE: 110 BPM | BODY MASS INDEX: 51.91 KG/M2 | SYSTOLIC BLOOD PRESSURE: 132 MMHG | RESPIRATION RATE: 22 BRPM | OXYGEN SATURATION: 98 % | HEIGHT: 63 IN | WEIGHT: 293 LBS | DIASTOLIC BLOOD PRESSURE: 84 MMHG | TEMPERATURE: 98 F

## 2020-02-28 DIAGNOSIS — H61.23 BILATERAL IMPACTED CERUMEN: Primary | ICD-10-CM

## 2020-02-28 DIAGNOSIS — R05.9 COUGH: ICD-10-CM

## 2020-02-28 PROCEDURE — 99214 OFFICE O/P EST MOD 30 MIN: CPT | Performed by: FAMILY MEDICINE

## 2020-02-28 RX ORDER — PROMETHAZINE HYDROCHLORIDE AND CODEINE PHOSPHATE 6.25; 1 MG/5ML; MG/5ML
5 SYRUP ORAL NIGHTLY PRN
Qty: 120 ML | Refills: 0 | Status: SHIPPED | OUTPATIENT
Start: 2020-02-28 | End: 2020-06-01 | Stop reason: ALTCHOICE

## 2020-02-28 NOTE — PROGRESS NOTES
CC: Ears clogged    HPI: Patient complains of ears clogged. Bilaterally. Moderate in severity causing him to lose some hearing. Is also uncomfortable slightly painful. Is been going on for several days. She tried Debrox without relief.   Also complains region 1/3/2011   • Unspecified essential hypertension     in pregnancy       Social History    Tobacco Use      Smoking status: Never Smoker      Smokeless tobacco: Never Used    Alcohol use:  Yes      Alcohol/week: 0.0 standard drinks      Comment: RARE

## 2020-04-20 ENCOUNTER — VIRTUAL PHONE E/M (OUTPATIENT)
Dept: FAMILY MEDICINE CLINIC | Facility: CLINIC | Age: 45
End: 2020-04-20
Payer: COMMERCIAL

## 2020-04-20 ENCOUNTER — PATIENT MESSAGE (OUTPATIENT)
Dept: FAMILY MEDICINE CLINIC | Facility: CLINIC | Age: 45
End: 2020-04-20

## 2020-04-20 DIAGNOSIS — I10 ESSENTIAL HYPERTENSION: Primary | ICD-10-CM

## 2020-04-20 PROCEDURE — 99214 OFFICE O/P EST MOD 30 MIN: CPT | Performed by: FAMILY MEDICINE

## 2020-04-20 RX ORDER — AMLODIPINE BESYLATE 5 MG/1
5 TABLET ORAL DAILY
Qty: 90 TABLET | Refills: 1 | Status: SHIPPED | OUTPATIENT
Start: 2020-04-20 | End: 2020-06-01

## 2020-04-20 NOTE — PROGRESS NOTES
CC: bp management    HPI:     Location at home  Quality elevations- she believe  Severity moderate  Duration chronic  Context more stress  Modifying factors dietary sodium has increaseed    ROS:  GENERAL HEALTH: feels well otherwise  SKIN: denies any unusu

## 2020-04-20 NOTE — TELEPHONE ENCOUNTER
Appt scheduled    Future Appointments   Date Time Provider Lena Garcia   4/20/2020  3:30 PM Aaron Coleman DO [facility]

## 2020-04-20 NOTE — TELEPHONE ENCOUNTER
From: Teodoro Randhawa  To:  Cristobal Ta DO  Sent: 4/20/2020 1:40 PM CDT  Subject: Prescription Question    Dr Paco Marion,    I was taking Amlodipine 5mg for my high blood pressure and I believe over the last few office visits my BP hasn't been great but hig

## 2020-05-28 ENCOUNTER — TELEPHONE (OUTPATIENT)
Dept: FAMILY MEDICINE CLINIC | Facility: CLINIC | Age: 45
End: 2020-05-28

## 2020-05-28 NOTE — TELEPHONE ENCOUNTER
Believes her blood pressure readings she is taking at home are not reading correctly. Wants to discuss.   Did not want to schedule appt for a BP check until she had doctor's input first.

## 2020-05-28 NOTE — TELEPHONE ENCOUNTER
Spoke to patient. Put back on amlodipine on 4/20/20. Was taking it regularly. Bought a BP machine and the cuff was too small. Had to order a new cuff which came today. BP reading was 178/105 and 156/105.  Patient was sitting down and relaxed when she

## 2020-05-28 NOTE — TELEPHONE ENCOUNTER
Schedule in office visit for next week. She is to bring her equipment with her. She should try to go back on the amlodipine if possible. But if symptoms return she can hold off until we see her.

## 2020-05-28 NOTE — TELEPHONE ENCOUNTER
Spoke to patient. Patient advised. Verbalized understanding. Scheduled OV. Advised to call from car when she arrives. Will bring BP machine and cuff.      Future Appointments   Date Time Provider Lena Garcia   6/1/2020  9:30 AM DO FREDY Bush

## 2020-06-01 ENCOUNTER — OFFICE VISIT (OUTPATIENT)
Dept: FAMILY MEDICINE CLINIC | Facility: CLINIC | Age: 45
End: 2020-06-01
Payer: COMMERCIAL

## 2020-06-01 VITALS
HEIGHT: 63 IN | TEMPERATURE: 97 F | BODY MASS INDEX: 51.91 KG/M2 | HEART RATE: 99 BPM | RESPIRATION RATE: 16 BRPM | SYSTOLIC BLOOD PRESSURE: 150 MMHG | WEIGHT: 293 LBS | OXYGEN SATURATION: 99 % | DIASTOLIC BLOOD PRESSURE: 92 MMHG

## 2020-06-01 DIAGNOSIS — R00.2 PALPITATION: ICD-10-CM

## 2020-06-01 DIAGNOSIS — Z00.00 GENERAL MEDICAL EXAM: ICD-10-CM

## 2020-06-01 DIAGNOSIS — I10 ESSENTIAL HYPERTENSION: Primary | ICD-10-CM

## 2020-06-01 PROCEDURE — 99214 OFFICE O/P EST MOD 30 MIN: CPT | Performed by: FAMILY MEDICINE

## 2020-06-01 PROCEDURE — 80061 LIPID PANEL: CPT | Performed by: FAMILY MEDICINE

## 2020-06-01 PROCEDURE — 83036 HEMOGLOBIN GLYCOSYLATED A1C: CPT | Performed by: FAMILY MEDICINE

## 2020-06-01 PROCEDURE — 93000 ELECTROCARDIOGRAM COMPLETE: CPT | Performed by: FAMILY MEDICINE

## 2020-06-01 PROCEDURE — 80050 GENERAL HEALTH PANEL: CPT | Performed by: FAMILY MEDICINE

## 2020-06-01 RX ORDER — OLMESARTAN MEDOXOMIL AND HYDROCHLOROTHIAZIDE 20/12.5 20; 12.5 MG/1; MG/1
1 TABLET ORAL DAILY
Qty: 90 TABLET | Refills: 0 | Status: SHIPPED | OUTPATIENT
Start: 2020-06-01 | End: 2020-08-18

## 2020-06-01 NOTE — PROGRESS NOTES
CC: bp issues    HPI:     Location any setting  Quality up and down  Severity moderate elevations  Duration several weeks  Timing daily  Context has gained weight  Modifying factors some recent palpitations or \"flutter\" in the chest  Associated symptoms Tobacco comment: non-smoker    Alcohol use:  Yes      Alcohol/week: 0.0 standard drinks      Comment: RARE    Drug use: No      Comment: no      EXAM:   BP (!) 150/92   Pulse 99   Temp 97 °F (36.1 °C) (Temporal)   Resp 16   Ht 63\"   Wt (!) 338 lb (153.3 kg

## 2020-06-18 ENCOUNTER — OFFICE VISIT (OUTPATIENT)
Dept: FAMILY MEDICINE CLINIC | Facility: CLINIC | Age: 45
End: 2020-06-18
Payer: COMMERCIAL

## 2020-06-18 VITALS
HEIGHT: 63.8 IN | RESPIRATION RATE: 18 BRPM | DIASTOLIC BLOOD PRESSURE: 80 MMHG | SYSTOLIC BLOOD PRESSURE: 138 MMHG | BODY MASS INDEX: 50.64 KG/M2 | TEMPERATURE: 99 F | HEART RATE: 91 BPM | OXYGEN SATURATION: 100 % | WEIGHT: 293 LBS

## 2020-06-18 DIAGNOSIS — Z00.00 GENERAL MEDICAL EXAM: Primary | ICD-10-CM

## 2020-06-18 PROCEDURE — 99396 PREV VISIT EST AGE 40-64: CPT | Performed by: FAMILY MEDICINE

## 2020-06-18 NOTE — PROGRESS NOTES
Qiana Estrada is a 41year old female who presents for a complete physical exam.   HPI:   Pt generally doing well. Patient sees gynecologist for all breast and  care. Feeling better with more hydration and less caffeine.          There is no immuniz 06/01/2020    LDL 65 06/01/2020    VLDL 16 06/01/2020    TCHDLRATIO 3.7 08/17/2013    NONHDLC 81 06/01/2020    CHOLHDLRATIO 3.4 10/28/2017     No results found for: PSA, QPSA, TOTPSASCREEN  Lab Results   Component Value Date    T4F 1.2 03/23/2019    TSH 2. Diabetes Other       Social History:  Social History    Tobacco Use      Smoking status: Never Smoker      Smokeless tobacco: Never Used      Tobacco comment: non-smoker    Alcohol use:  Yes      Alcohol/week: 0.0 standard drinks      Comment: RARE    Drug exercise. Health maintenance, will check fasting labs. Colon cancer screening: not due currently. Pt to see her gynecologist. The patient indicates understanding of these issues and agrees to the plan. The patient is asked to return for CPX in 1 year.

## 2020-08-18 RX ORDER — OLMESARTAN MEDOXOMIL AND HYDROCHLOROTHIAZIDE 20/12.5 20; 12.5 MG/1; MG/1
1 TABLET ORAL DAILY
Qty: 90 TABLET | Refills: 0 | Status: SHIPPED | OUTPATIENT
Start: 2020-08-18 | End: 2020-11-13

## 2020-08-18 NOTE — TELEPHONE ENCOUNTER
Hypertension Medications Protocol Passed8/18 10:05 AM   CMP or BMP in past 12 months    Last serum creatinine< 2.0    Appointment in past 6 or next 3 months     Last refill 6/1/20 #90 0 refill  Last CMP 6/1/20  Last visit 6/18/20  Refill sent

## 2020-08-31 RX ORDER — IBUPROFEN 800 MG/1
TABLET ORAL
Qty: 180 TABLET | Refills: 0 | Status: SHIPPED | OUTPATIENT
Start: 2020-08-31

## 2020-08-31 RX ORDER — IBUPROFEN 800 MG/1
TABLET ORAL
Qty: 180 TABLET | Refills: 0 | OUTPATIENT
Start: 2020-08-31

## 2020-08-31 NOTE — TELEPHONE ENCOUNTER
Last refilled on 9/16/19 for # 180 with 0 refills  Last OV 6/18/20  No future appointments. Thank you.

## 2020-11-13 RX ORDER — OLMESARTAN MEDOXOMIL AND HYDROCHLOROTHIAZIDE 20/12.5 20; 12.5 MG/1; MG/1
1 TABLET ORAL DAILY
Qty: 90 TABLET | Refills: 0 | Status: SHIPPED | OUTPATIENT
Start: 2020-11-13 | End: 2021-02-15

## 2021-01-18 ENCOUNTER — TELEPHONE (OUTPATIENT)
Dept: FAMILY MEDICINE CLINIC | Facility: CLINIC | Age: 46
End: 2021-01-18

## 2021-01-18 NOTE — TELEPHONE ENCOUNTER
Pt needs her ears flushed some point this week. Dr Essence Ring is booked up.  Is there anyway to fit her in or can I schedule with Dr Heidi Kirby or NP

## 2021-01-25 ENCOUNTER — OFFICE VISIT (OUTPATIENT)
Dept: FAMILY MEDICINE CLINIC | Facility: CLINIC | Age: 46
End: 2021-01-25
Payer: COMMERCIAL

## 2021-01-25 VITALS
TEMPERATURE: 98 F | HEIGHT: 63.8 IN | DIASTOLIC BLOOD PRESSURE: 78 MMHG | WEIGHT: 293 LBS | OXYGEN SATURATION: 98 % | HEART RATE: 104 BPM | RESPIRATION RATE: 18 BRPM | SYSTOLIC BLOOD PRESSURE: 118 MMHG | BODY MASS INDEX: 50.64 KG/M2

## 2021-01-25 DIAGNOSIS — H61.23 BILATERAL IMPACTED CERUMEN: Primary | ICD-10-CM

## 2021-01-25 PROCEDURE — 3008F BODY MASS INDEX DOCD: CPT | Performed by: FAMILY MEDICINE

## 2021-01-25 PROCEDURE — 3078F DIAST BP <80 MM HG: CPT | Performed by: FAMILY MEDICINE

## 2021-01-25 PROCEDURE — 3074F SYST BP LT 130 MM HG: CPT | Performed by: FAMILY MEDICINE

## 2021-01-25 RX ORDER — CHOLECALCIFEROL (VITAMIN D3) 50 MCG
TABLET ORAL
COMMUNITY

## 2021-01-25 NOTE — PROGRESS NOTES
Patient presents with:  Ear Problem: wants Ear flush, per pt       HPI:    Patient ID: Joseph Franklin is a 41year old female c/o muffled hearing in left ear for 10 days. No ear pain or drainage. Has hx of cerumen impaction 1-2x/yr. No tinnitus.       Rev Unspecified essential hypertension     in pregnancy      Past Surgical History:   Procedure Laterality Date   •       in  and       Family History   Problem Relation Age of Onset   • Diabetes Mother    • Heart Disease Maternal Grandmother

## 2021-02-15 RX ORDER — OLMESARTAN MEDOXOMIL AND HYDROCHLOROTHIAZIDE 20/12.5 20; 12.5 MG/1; MG/1
1 TABLET ORAL DAILY
Qty: 90 TABLET | Refills: 0 | Status: SHIPPED | OUTPATIENT
Start: 2021-02-15 | End: 2021-05-18

## 2021-02-15 NOTE — TELEPHONE ENCOUNTER
Hypertension Medications Protocol [id number]/14/2021 09:46 AM   CMP or BMP in past 12 months Protocol Details    Last serum creatinine< 2.0     Appointment in past 6 or next 3 months      Last OV 1/25/21  Last CMP 6/1/20  Last refill 11/30/20 #90 0 refill

## 2021-05-18 RX ORDER — OLMESARTAN MEDOXOMIL AND HYDROCHLOROTHIAZIDE 20/12.5 20; 12.5 MG/1; MG/1
1 TABLET ORAL DAILY
Qty: 90 TABLET | Refills: 0 | Status: SHIPPED | OUTPATIENT
Start: 2021-05-18 | End: 2021-08-18

## 2021-05-18 NOTE — TELEPHONE ENCOUNTER
Hypertension Medications Protocol [id number]/17/2021 05:56 PM   CMP or BMP in past 12 months Protocol Details    Last serum creatinine< 2.0     Appointment in past 6 or next 3 months      Last refill 1/25/21  Last CMP 6/1/20  Last OV  2/15/21 #90 0 refill

## 2021-05-19 PROCEDURE — 88305 TISSUE EXAM BY PATHOLOGIST: CPT | Performed by: OBSTETRICS & GYNECOLOGY

## 2021-08-12 ENCOUNTER — TELEMEDICINE (OUTPATIENT)
Dept: FAMILY MEDICINE CLINIC | Facility: CLINIC | Age: 46
End: 2021-08-12

## 2021-08-12 DIAGNOSIS — J06.9 VIRAL URI: Primary | ICD-10-CM

## 2021-08-12 DIAGNOSIS — I10 ESSENTIAL HYPERTENSION, BENIGN: ICD-10-CM

## 2021-08-12 PROCEDURE — 99213 OFFICE O/P EST LOW 20 MIN: CPT | Performed by: NURSE PRACTITIONER

## 2021-08-12 NOTE — PROGRESS NOTES
Virtual/Telephone Check-In    Connor Nathan  verbally consents to a Virtual/Telephone Check-In service on 8/12/2021 . Patient understands and accepts financial responsibility for any deductible, co-insurance and/or co-pays associated with this service. Anemia 02/28/11    changed on 3/3/11 to Iron Deficiency Anemia   • Anxiety state    • Backache, unspecified 1/3/2011   • DIABETES    • Diverticulosis of large intestine    • Dysfunction of eustachian tube 2/27/2012   • Essential hypertension, benign 4/23/2 Discussed viral nature of URI sx. Discussed supportive care, including Flonase. Avoid pseudophed-containing decongestants, as these can increase BP. If sx worsening after 5 days or persisting longer than 7 days, should follow up.     The patient indicat

## 2021-08-17 NOTE — TELEPHONE ENCOUNTER
Hypertension Medications Protocol [id number]/17/2021 03:11 PM   CMP or BMP in past 12 months Protocol Details    Appointment in past 6 or next 3 months           Last OV 1/25/21  Last refill 5/18/21 #90 0 refill  Due for f/u  No future appointments.    [facility] se

## 2021-08-18 RX ORDER — OLMESARTAN MEDOXOMIL AND HYDROCHLOROTHIAZIDE 20/12.5 20; 12.5 MG/1; MG/1
1 TABLET ORAL DAILY
Qty: 90 TABLET | Refills: 0 | Status: SHIPPED | OUTPATIENT
Start: 2021-08-18 | End: 2021-09-10

## 2021-08-18 NOTE — TELEPHONE ENCOUNTER
Pt scheduled OV with dr Daniele Deal   Date Time Provider Lena Radha   9/10/2021  8:15 AM Yun Rios MD [facility]     [facility] bridging rx [facility]:  [id number] [address] AT [place] [address] [address]

## 2021-09-10 ENCOUNTER — OFFICE VISIT (OUTPATIENT)
Dept: FAMILY MEDICINE CLINIC | Facility: CLINIC | Age: 46
End: 2021-09-10
Payer: COMMERCIAL

## 2021-09-10 VITALS
RESPIRATION RATE: 20 BRPM | SYSTOLIC BLOOD PRESSURE: 132 MMHG | BODY MASS INDEX: 51.91 KG/M2 | HEART RATE: 89 BPM | DIASTOLIC BLOOD PRESSURE: 84 MMHG | HEIGHT: 63 IN | OXYGEN SATURATION: 98 % | WEIGHT: 293 LBS | TEMPERATURE: 98 F

## 2021-09-10 DIAGNOSIS — E55.9 VITAMIN D DEFICIENCY: ICD-10-CM

## 2021-09-10 DIAGNOSIS — I10 ESSENTIAL HYPERTENSION: ICD-10-CM

## 2021-09-10 DIAGNOSIS — D50.9 IRON DEFICIENCY ANEMIA, UNSPECIFIED IRON DEFICIENCY ANEMIA TYPE: ICD-10-CM

## 2021-09-10 DIAGNOSIS — R73.03 PREDIABETES: ICD-10-CM

## 2021-09-10 DIAGNOSIS — J45.20 MILD INTERMITTENT REACTIVE AIRWAY DISEASE WITHOUT COMPLICATION: Primary | ICD-10-CM

## 2021-09-10 LAB
BASOPHILS # BLD AUTO: 0.07 X10(3) UL (ref 0–0.2)
BASOPHILS NFR BLD AUTO: 0.9 %
DEPRECATED HBV CORE AB SER IA-ACNC: 12.8 NG/ML
EOSINOPHIL # BLD AUTO: 0.17 X10(3) UL (ref 0–0.7)
EOSINOPHIL NFR BLD AUTO: 2.1 %
ERYTHROCYTE [DISTWIDTH] IN BLOOD BY AUTOMATED COUNT: 15.9 %
EST. AVERAGE GLUCOSE BLD GHB EST-MCNC: 146 MG/DL (ref 68–126)
HBA1C MFR BLD HPLC: 6.7 % (ref ?–5.7)
HCT VFR BLD AUTO: 36.5 %
HGB BLD-MCNC: 10.8 G/DL
IMM GRANULOCYTES # BLD AUTO: 0.03 X10(3) UL (ref 0–1)
IMM GRANULOCYTES NFR BLD: 0.4 %
LYMPHOCYTES # BLD AUTO: 2.52 X10(3) UL (ref 1–4)
LYMPHOCYTES NFR BLD AUTO: 31.3 %
MCH RBC QN AUTO: 24.1 PG (ref 26–34)
MCHC RBC AUTO-ENTMCNC: 29.6 G/DL (ref 31–37)
MCV RBC AUTO: 81.3 FL
MONOCYTES # BLD AUTO: 0.49 X10(3) UL (ref 0.1–1)
MONOCYTES NFR BLD AUTO: 6.1 %
NEUTROPHILS # BLD AUTO: 4.77 X10 (3) UL (ref 1.5–7.7)
NEUTROPHILS # BLD AUTO: 4.77 X10(3) UL (ref 1.5–7.7)
NEUTROPHILS NFR BLD AUTO: 59.2 %
PLATELET # BLD AUTO: 360 10(3)UL (ref 150–450)
RBC # BLD AUTO: 4.49 X10(6)UL
URATE SERPL-MCNC: 5.9 MG/DL
VIT D+METAB SERPL-MCNC: 31.1 NG/ML (ref 30–100)
WBC # BLD AUTO: 8.1 X10(3) UL (ref 4–11)

## 2021-09-10 PROCEDURE — 3008F BODY MASS INDEX DOCD: CPT | Performed by: FAMILY MEDICINE

## 2021-09-10 PROCEDURE — 83036 HEMOGLOBIN GLYCOSYLATED A1C: CPT | Performed by: FAMILY MEDICINE

## 2021-09-10 PROCEDURE — 3079F DIAST BP 80-89 MM HG: CPT | Performed by: FAMILY MEDICINE

## 2021-09-10 PROCEDURE — 85025 COMPLETE CBC W/AUTO DIFF WBC: CPT | Performed by: FAMILY MEDICINE

## 2021-09-10 PROCEDURE — 99214 OFFICE O/P EST MOD 30 MIN: CPT | Performed by: FAMILY MEDICINE

## 2021-09-10 PROCEDURE — 3075F SYST BP GE 130 - 139MM HG: CPT | Performed by: FAMILY MEDICINE

## 2021-09-10 PROCEDURE — 84550 ASSAY OF BLOOD/URIC ACID: CPT | Performed by: FAMILY MEDICINE

## 2021-09-10 PROCEDURE — 82728 ASSAY OF FERRITIN: CPT | Performed by: FAMILY MEDICINE

## 2021-09-10 PROCEDURE — 82306 VITAMIN D 25 HYDROXY: CPT | Performed by: FAMILY MEDICINE

## 2021-09-10 RX ORDER — ALBUTEROL SULFATE 90 UG/1
2 AEROSOL, METERED RESPIRATORY (INHALATION) EVERY 4 HOURS PRN
Qty: 1 EACH | Refills: 2 | Status: SHIPPED | OUTPATIENT
Start: 2021-09-10

## 2021-09-10 RX ORDER — OLMESARTAN MEDOXOMIL AND HYDROCHLOROTHIAZIDE 20/12.5 20; 12.5 MG/1; MG/1
1 TABLET ORAL DAILY
Qty: 90 TABLET | Refills: 2 | Status: SHIPPED | OUTPATIENT
Start: 2021-09-10 | End: 2022-02-05

## 2021-09-10 NOTE — PROGRESS NOTES
Patient presents with:  Blood Pressure: follow up . HPI:    Patient ID: Josselin Berry is a 40year old female here for follow-up. Has HTN  Well controlled on current med  No med s/e    Had blood work done through work in February 2021.   Found t Sanjeev Tarango is a 26 y.o. female is a new patient who presents with vaginal discharge and dysuria. History provided by: patient      HPI    Was seen in the ER 1/19/17 for dysuria and was told had a UTI. Was started on antibiotics and advised after completion of antibiotics to fu. Dysuria is better but not resolved  Positive increased frequency of urination but better  Urgency has resolved    Having vaginal discharge. Which got worse and thick after antibiotics. Positive vaginal itching. No fever, chills, abdominal pain, back pain, nausea, vomiting    Sexual History  Current partner: 5 months  Number of partners in past: just 1  Male  History of STDs: no  History of STD in partner: no    There is no problem list on file for this patient. Current Outpatient Prescriptions:     fluconazole (DIFLUCAN) 150 mg tablet, Take 1 Tab by mouth once for 1 dose. Can repeat in 72 hours if not better, Disp: 2 Tab, Rfl: 0     No Known Allergies      No past medical history on file. ROS  As stated in HPI    Physical Exam   Constitutional: She is well-developed, well-nourished, and in no distress. /68  Pulse 68  Temp 97.8 °F (36.6 °C) (Oral)   Resp 16  Ht 5' 1\" (1.549 m)  Wt 124 lb (56.2 kg)  LMP 01/15/2017  SpO2 100%  BMI 23.43 kg/m2    Cardiovascular: Normal rate, regular rhythm, normal heart sounds and intact distal pulses. Exam reveals no gallop and no friction rub. No murmur heard. Pulmonary/Chest: Effort normal and breath sounds normal. No respiratory distress. She has no wheezes. She has no rales. Abdominal: Soft. Bowel sounds are normal. She exhibits no distension and no mass. There is no tenderness. There is no rebound and no guarding. No CVA tenderness bilaterally   Vitals reviewed. Pelvic exam- Chaperone R Sin, LPN; Positive thick white vaginal discharge along vaginal wall and at cervix. Remaining cervix, uterus, and adenexa within normal limits.     Data  UA  Component Results Component Value Flag Ref Range Units Status   Color (UA POC) Yellow    Final   Clarity (UA POC) Slightly Cloudy    Final   Glucose (UA POC) Negative  Negative  Final   Bilirubin (UA POC) Negative  Negative  Final   Ketones (UA POC) Negative  Negative  Final   Specific gravity (UA POC) 1.025  1.001 - 1.035  Final   Blood (UA POC) Negative  Negative  Final   pH (UA POC) 5.5  4.6 - 8.0  Final   Protein (UA POC) Trace  Negative mg/dL Final   Urobilinogen (UA POC) 0.2 mg/dL  0.2 - 1  Final   Nitrites (UA POC) Negative  Negative  Final   Leukocyte esterase (UA POC) Trace  Negative  Final     Microscopic exam: Personally reviewed and demonstrates   WBC 2-5  RBC  0-2  EPITHELIAL CELLS few   BACTERIA few    Wet mount: Personally reviewed and demonstrates  Microscopic Wet Prep  WBC mod  Bacteria mod  Epithelial cells mod    KOH rare bud yeast    Assessment/Plan:   Sil Montilla is a 26 y.o. female is a new patient who presents with vaginal discharge and dysuria. ICD-10-CM ICD-9-CM    1. Vagina, candidiasis B37.3 112.1 fluconazole (DIFLUCAN) 150 mg tablet   2. Vaginal discharge N89.8 623.5 AMB POC SMEAR, STAIN & INTERPRET, WET MOUNT   3. Dysuria R30.0 788.1 AMB POC URINALYSIS DIP STICK AUTO W/ MICRO   4. Abnormal urinalysis R82.90 791.9 CULTURE, URINE     1. Vagina, candidiasis  Positive candida. Will treat. - fluconazole (DIFLUCAN) 150 mg tablet; Take 1 Tab by mouth once for 1 dose. Can repeat in 72 hours if not better  Dispense: 2 Tab; Refill: 0  - AMB POC SMEAR, STAIN & INTERPRET, WET MOUNT    3. Dysuria  UA with trace leucocyte esterase and positive WBC. As patient already treated and feeling better will send of culture prior to more antibiotics  - AMB POC URINALYSIS DIP STICK AUTO W/ MICRO  - CULTURE, URINE      Follow-up Disposition:  Return if symptoms worsen or fail to improve. I have discussed the diagnosis with the patient and the intended plan as seen in the above orders.   The patient has of cervix    • Acute bronchitis 5/9/2011   • Allergic rhinitis, cause unspecified 4/23/2012   • Anemia 02/28/11    changed on 3/3/11 to Iron Deficiency Anemia   • Anxiety state    • Backache, unspecified 1/3/2011   • DIABETES    • Diverticulosis of large i received an after-visit summary and questions were answered concerning future plans. I have discussed medication side effects and warnings with the patient as well.     Glenna Serra MD  Family Medicine Resident (PGY-3)  2/8/2017 regular rhythm. Heart sounds: No murmur heard. Pulmonary:      Effort: Pulmonary effort is normal.      Breath sounds: Normal breath sounds. Musculoskeletal:      Right lower leg: No edema. Left lower leg: No edema.    Lymphadenopathy:

## 2021-09-25 ENCOUNTER — TELEPHONE (OUTPATIENT)
Dept: FAMILY MEDICINE CLINIC | Facility: CLINIC | Age: 46
End: 2021-09-25

## 2021-09-25 NOTE — TELEPHONE ENCOUNTER
----- Message from Giovanni Severe, MD sent at 9/25/2021  7:42 AM CDT -----  Hemoglobin A1c is 6.7, putting her in diabetes range. Try to lower with diet and exercise. Avoid sugars and follow low-carb diet.   We will recheck 3 months with her annual physica

## 2021-09-25 NOTE — TELEPHONE ENCOUNTER
Patient advised. Verbalized understanding. States already takes 2000IU vit D daily-double it?   Recall placed for px and labs  Prefers to wait to do colonoscopy in 2022    RN-SEND MYCHART BACK TO PATIENT REGARDING VIT D

## 2021-09-28 NOTE — TELEPHONE ENCOUNTER
Delivery Read From Message Type Attachments Subject   9/28/2021  8:14 AM Sulaiman Peck RN Patient Medical Advice Request  vit D

## 2021-09-28 NOTE — TELEPHONE ENCOUNTER
Increase vitamin D dose to 4000 daily for 4 weeks then go back to vitamin D 2000 units daily  Please have her do a FIT test.

## 2021-12-23 ENCOUNTER — TELEPHONE (OUTPATIENT)
Dept: FAMILY MEDICINE CLINIC | Facility: CLINIC | Age: 46
End: 2021-12-23

## 2022-02-05 ENCOUNTER — OFFICE VISIT (OUTPATIENT)
Dept: FAMILY MEDICINE CLINIC | Facility: CLINIC | Age: 47
End: 2022-02-05
Payer: COMMERCIAL

## 2022-02-05 VITALS
DIASTOLIC BLOOD PRESSURE: 80 MMHG | OXYGEN SATURATION: 98 % | SYSTOLIC BLOOD PRESSURE: 128 MMHG | HEART RATE: 80 BPM | WEIGHT: 293 LBS | HEIGHT: 63 IN | TEMPERATURE: 98 F | BODY MASS INDEX: 51.91 KG/M2 | RESPIRATION RATE: 18 BRPM

## 2022-02-05 DIAGNOSIS — R73.03 PREDIABETES: ICD-10-CM

## 2022-02-05 DIAGNOSIS — Z00.00 ANNUAL PHYSICAL EXAM: ICD-10-CM

## 2022-02-05 DIAGNOSIS — J45.20 MILD INTERMITTENT REACTIVE AIRWAY DISEASE WITHOUT COMPLICATION: ICD-10-CM

## 2022-02-05 DIAGNOSIS — I10 ESSENTIAL HYPERTENSION: ICD-10-CM

## 2022-02-05 DIAGNOSIS — Z12.11 SCREENING FOR COLON CANCER: Primary | ICD-10-CM

## 2022-02-05 PROBLEM — E66.01 MORBID (SEVERE) OBESITY DUE TO EXCESS CALORIES (HCC): Status: ACTIVE | Noted: 2022-02-05

## 2022-02-05 PROCEDURE — 3079F DIAST BP 80-89 MM HG: CPT | Performed by: FAMILY MEDICINE

## 2022-02-05 PROCEDURE — 99396 PREV VISIT EST AGE 40-64: CPT | Performed by: FAMILY MEDICINE

## 2022-02-05 PROCEDURE — 3074F SYST BP LT 130 MM HG: CPT | Performed by: FAMILY MEDICINE

## 2022-02-05 PROCEDURE — 3008F BODY MASS INDEX DOCD: CPT | Performed by: FAMILY MEDICINE

## 2022-02-05 RX ORDER — OLMESARTAN MEDOXOMIL AND HYDROCHLOROTHIAZIDE 20/12.5 20; 12.5 MG/1; MG/1
1 TABLET ORAL DAILY
Qty: 90 TABLET | Refills: 2 | Status: SHIPPED | OUTPATIENT
Start: 2022-02-05 | End: 2023-01-31

## 2022-02-28 ENCOUNTER — OFFICE VISIT (OUTPATIENT)
Dept: FAMILY MEDICINE CLINIC | Facility: CLINIC | Age: 47
End: 2022-02-28
Payer: COMMERCIAL

## 2022-02-28 VITALS
SYSTOLIC BLOOD PRESSURE: 136 MMHG | RESPIRATION RATE: 18 BRPM | WEIGHT: 293 LBS | HEIGHT: 63 IN | OXYGEN SATURATION: 98 % | BODY MASS INDEX: 51.91 KG/M2 | TEMPERATURE: 98 F | HEART RATE: 82 BPM | DIASTOLIC BLOOD PRESSURE: 80 MMHG

## 2022-02-28 DIAGNOSIS — H66.91 RIGHT OTITIS MEDIA, UNSPECIFIED OTITIS MEDIA TYPE: Primary | ICD-10-CM

## 2022-02-28 PROCEDURE — 3008F BODY MASS INDEX DOCD: CPT | Performed by: FAMILY MEDICINE

## 2022-02-28 PROCEDURE — 3079F DIAST BP 80-89 MM HG: CPT | Performed by: FAMILY MEDICINE

## 2022-02-28 PROCEDURE — 3075F SYST BP GE 130 - 139MM HG: CPT | Performed by: FAMILY MEDICINE

## 2022-02-28 PROCEDURE — 99214 OFFICE O/P EST MOD 30 MIN: CPT | Performed by: FAMILY MEDICINE

## 2022-02-28 RX ORDER — AMOXICILLIN 875 MG/1
875 TABLET, COATED ORAL 2 TIMES DAILY
Qty: 20 TABLET | Refills: 0 | Status: SHIPPED | OUTPATIENT
Start: 2022-02-28 | End: 2022-03-10

## 2022-04-19 ENCOUNTER — OFFICE VISIT (OUTPATIENT)
Dept: FAMILY MEDICINE CLINIC | Facility: CLINIC | Age: 47
End: 2022-04-19
Payer: COMMERCIAL

## 2022-04-19 VITALS — HEART RATE: 92 BPM | TEMPERATURE: 98 F | OXYGEN SATURATION: 99 % | RESPIRATION RATE: 18 BRPM

## 2022-04-19 DIAGNOSIS — J01.00 ACUTE MAXILLARY SINUSITIS, RECURRENCE NOT SPECIFIED: Primary | ICD-10-CM

## 2022-04-19 PROCEDURE — 99214 OFFICE O/P EST MOD 30 MIN: CPT | Performed by: FAMILY MEDICINE

## 2022-04-19 RX ORDER — AZITHROMYCIN 250 MG/1
TABLET, FILM COATED ORAL
Qty: 6 TABLET | Refills: 0 | Status: SHIPPED | OUTPATIENT
Start: 2022-04-19 | End: 2022-04-24

## 2022-10-14 NOTE — TELEPHONE ENCOUNTER
Last visit 04/19/2022  Last refill 02/05/2022  No future appts.    Asthma & COPD Medication Protocol Failed 10/13/2022 10:58 PM    Asthma Action Score greater than or equal to 20    AAP/ACT given in last 12 months    Appointment in past 6 or next 3 months

## 2022-10-16 RX ORDER — FLUTICASONE FUROATE AND VILANTEROL 200; 25 UG/1; UG/1
1 POWDER RESPIRATORY (INHALATION) DAILY
Qty: 3 EACH | Refills: 0 | Status: SHIPPED | OUTPATIENT
Start: 2022-10-16

## 2022-10-21 ENCOUNTER — PATIENT MESSAGE (OUTPATIENT)
Dept: FAMILY MEDICINE CLINIC | Facility: CLINIC | Age: 47
End: 2022-10-21

## 2022-10-21 ENCOUNTER — HOSPITAL ENCOUNTER (OUTPATIENT)
Age: 47
Discharge: HOME | End: 2022-10-21
Payer: COMMERCIAL

## 2022-10-21 VITALS
HEART RATE: 98 BPM | DIASTOLIC BLOOD PRESSURE: 94 MMHG | RESPIRATION RATE: 16 BRPM | HEIGHT: 63 IN | OXYGEN SATURATION: 97 % | TEMPERATURE: 98 F | WEIGHT: 293 LBS | SYSTOLIC BLOOD PRESSURE: 128 MMHG | BODY MASS INDEX: 51.91 KG/M2

## 2022-10-21 DIAGNOSIS — R05.1 ACUTE COUGH: ICD-10-CM

## 2022-10-21 DIAGNOSIS — J04.0 ACUTE LARYNGITIS: Primary | ICD-10-CM

## 2022-10-21 PROCEDURE — 99203 OFFICE O/P NEW LOW 30 MIN: CPT | Performed by: PHYSICIAN ASSISTANT

## 2022-10-21 RX ORDER — BENZONATATE 200 MG/1
200 CAPSULE ORAL 3 TIMES DAILY PRN
Qty: 30 CAPSULE | Refills: 0 | Status: SHIPPED | OUTPATIENT
Start: 2022-10-21 | End: 2022-11-20

## 2022-10-21 RX ORDER — METHYLPREDNISOLONE 4 MG/1
TABLET ORAL
Qty: 1 EACH | Refills: 0 | Status: SHIPPED | OUTPATIENT
Start: 2022-10-21

## 2022-10-21 NOTE — TELEPHONE ENCOUNTER
From: Nayla Kaur  To: Millie Harvey MD  Sent: 10/21/2022 11:23 AM CDT  Subject: Frequent Coughing at Night     I started experiencing a slight sore throat on Aristeo 10/16, no fever. The sore throat was gone by Monday morning. I just had laryngitis on Monday and Tuesday. Then I began experiencing the need to clear my throat from what I assume is post nasal drip which has turned into a great amount of coughing frequently at night. It is preventing me from getting much sleep and is now causing my ribs to ache from the vigorous coughing. Is there anything I can be prescribed to help with this cough so I can get sleep at night and so my voice can fully return as it continues to be very hoarse with the post nasal drip and frequent night coughing. Please let me know.   Thank you, Angelito Guzman

## 2022-10-21 NOTE — TELEPHONE ENCOUNTER
Sore throat started 10/16  Sore throat has resolved  No fever  Patient lost her voice and has been coughing  Patient has a severe persistent cough at night especially or when trying to talk  Unable to cough anything up but feels it in her throat  Patient feels the constant need to clear her throat  Having trouble sleeping at night due to cough - tried sleeping in recliner to see if this helped but it did not  Patient has tried delsym and hot tea/fluids  Patient denies SOB/difficulty breathing. Please advise.

## 2022-10-21 NOTE — ED INITIAL ASSESSMENT (HPI)
Pt with sore throat and fever on mon. Lost voice. Coughing a lot at night. Throat not sore but feels like phlegm home covid test negative.  Denies fevers

## 2022-11-21 ENCOUNTER — HOSPITAL ENCOUNTER (OUTPATIENT)
Age: 47
Discharge: HOME | End: 2022-11-21
Payer: COMMERCIAL

## 2022-11-21 VITALS
BODY MASS INDEX: 51.91 KG/M2 | HEIGHT: 63 IN | OXYGEN SATURATION: 98 % | WEIGHT: 293 LBS | TEMPERATURE: 100 F | DIASTOLIC BLOOD PRESSURE: 72 MMHG | SYSTOLIC BLOOD PRESSURE: 118 MMHG | RESPIRATION RATE: 24 BRPM | HEART RATE: 102 BPM

## 2022-11-21 DIAGNOSIS — J40 BRONCHITIS: Primary | ICD-10-CM

## 2022-11-21 PROCEDURE — 94640 AIRWAY INHALATION TREATMENT: CPT | Performed by: NURSE PRACTITIONER

## 2022-11-21 PROCEDURE — 99213 OFFICE O/P EST LOW 20 MIN: CPT | Performed by: NURSE PRACTITIONER

## 2022-11-21 RX ORDER — PREDNISONE 20 MG/1
60 TABLET ORAL ONCE
Status: COMPLETED | OUTPATIENT
Start: 2022-11-21 | End: 2022-11-21

## 2022-11-21 RX ORDER — ALBUTEROL SULFATE 90 UG/1
2 AEROSOL, METERED RESPIRATORY (INHALATION) EVERY 4 HOURS PRN
Qty: 1 EACH | Refills: 0 | Status: SHIPPED | OUTPATIENT
Start: 2022-11-21 | End: 2022-12-21

## 2022-11-21 RX ORDER — ALBUTEROL SULFATE 2.5 MG/3ML
2.5 SOLUTION RESPIRATORY (INHALATION) EVERY 4 HOURS PRN
Qty: 30 EACH | Refills: 0 | Status: SHIPPED | OUTPATIENT
Start: 2022-11-21 | End: 2022-12-21

## 2022-11-21 RX ORDER — PREDNISONE 20 MG/1
40 TABLET ORAL DAILY
Qty: 10 TABLET | Refills: 0 | Status: SHIPPED | OUTPATIENT
Start: 2022-11-21 | End: 2022-11-26

## 2022-11-21 RX ORDER — IPRATROPIUM BROMIDE AND ALBUTEROL SULFATE 2.5; .5 MG/3ML; MG/3ML
3 SOLUTION RESPIRATORY (INHALATION) ONCE
Status: COMPLETED | OUTPATIENT
Start: 2022-11-21 | End: 2022-11-21

## 2022-11-21 NOTE — ED INITIAL ASSESSMENT (HPI)
Cough and wheezing since Friday. States using her inhaler more often with no relief.  No chills, fever or congestion

## 2022-12-19 NOTE — TELEPHONE ENCOUNTER
Last visit 04/19/2022  Last refill 02/05/2022     Hypertension Medications Protocol Failed 12/19/2022 08:02 AM   Protocol Details  CMP or BMP in past 12 months    Appointment in past 6 or next 3 months    Last serum creatinine< 2.0

## 2022-12-20 RX ORDER — OLMESARTAN MEDOXOMIL AND HYDROCHLOROTHIAZIDE 20/12.5 20; 12.5 MG/1; MG/1
1 TABLET ORAL DAILY
Qty: 90 TABLET | Refills: 0 | Status: SHIPPED | OUTPATIENT
Start: 2022-12-20 | End: 2023-12-15

## 2023-01-13 ENCOUNTER — TELEPHONE (OUTPATIENT)
Dept: FAMILY MEDICINE CLINIC | Facility: CLINIC | Age: 48
End: 2023-01-13

## 2023-01-13 NOTE — TELEPHONE ENCOUNTER
Future Appointments   Date Time Provider Lena Radha   1/21/2023  9:30 AM Shola Funk MD [facility]

## 2023-01-13 NOTE — TELEPHONE ENCOUNTER
Last visit 04/19/2022  Last refill 10/16/2022   No future appts. [employer] message sent to patient to let her know she is due for a appt.        Asthma & COPD Medication Protocol Failed 01/13/2023 10:44 AM    Asthma Action Score greater than or equal to 20    Appointment in past 6 or next 3 months     AAP/ACT given in last 12 months

## 2023-01-21 ENCOUNTER — OFFICE VISIT (OUTPATIENT)
Dept: FAMILY MEDICINE CLINIC | Facility: CLINIC | Age: 48
End: 2023-01-21
Payer: COMMERCIAL

## 2023-01-21 DIAGNOSIS — I10 ESSENTIAL HYPERTENSION: Primary | ICD-10-CM

## 2023-01-21 DIAGNOSIS — J45.20 MILD INTERMITTENT REACTIVE AIRWAY DISEASE WITHOUT COMPLICATION: ICD-10-CM

## 2023-01-21 DIAGNOSIS — Z00.00 ANNUAL PHYSICAL EXAM: ICD-10-CM

## 2023-01-21 PROCEDURE — 3075F SYST BP GE 130 - 139MM HG: CPT | Performed by: FAMILY MEDICINE

## 2023-01-21 PROCEDURE — 3079F DIAST BP 80-89 MM HG: CPT | Performed by: FAMILY MEDICINE

## 2023-01-21 PROCEDURE — 99214 OFFICE O/P EST MOD 30 MIN: CPT | Performed by: FAMILY MEDICINE

## 2023-01-21 PROCEDURE — 3008F BODY MASS INDEX DOCD: CPT | Performed by: FAMILY MEDICINE

## 2023-01-21 RX ORDER — OLMESARTAN MEDOXOMIL AND HYDROCHLOROTHIAZIDE 20/12.5 20; 12.5 MG/1; MG/1
1 TABLET ORAL DAILY
Qty: 90 TABLET | Refills: 0 | Status: SHIPPED | OUTPATIENT
Start: 2023-01-21 | End: 2024-01-16

## 2023-01-22 VITALS
WEIGHT: 282 LBS | SYSTOLIC BLOOD PRESSURE: 138 MMHG | OXYGEN SATURATION: 98 % | HEIGHT: 63 IN | DIASTOLIC BLOOD PRESSURE: 86 MMHG | TEMPERATURE: 98 F | BODY MASS INDEX: 49.96 KG/M2 | RESPIRATION RATE: 18 BRPM | HEART RATE: 88 BPM

## 2023-02-21 ENCOUNTER — TELEPHONE (OUTPATIENT)
Dept: FAMILY MEDICINE CLINIC | Facility: CLINIC | Age: 48
End: 2023-02-21

## 2023-03-09 ENCOUNTER — TELEPHONE (OUTPATIENT)
Dept: FAMILY MEDICINE CLINIC | Facility: CLINIC | Age: 48
End: 2023-03-09

## 2023-03-09 NOTE — TELEPHONE ENCOUNTER
Received a fax from [facility] [place] requesting collection date and accession number or requisition number  On Nevin's desk  Unsure how to proceed  No results in Epic

## 2023-03-15 ENCOUNTER — TELEPHONE (OUTPATIENT)
Dept: FAMILY MEDICINE CLINIC | Facility: CLINIC | Age: 48
End: 2023-03-15

## 2023-03-15 NOTE — TELEPHONE ENCOUNTER
Pt is @ [facility] right now for her bloodwork,  requested the Orders faxed to  Fax # [phone]  Orders Faxed

## 2023-04-28 ENCOUNTER — PATIENT MESSAGE (OUTPATIENT)
Dept: FAMILY MEDICINE CLINIC | Facility: CLINIC | Age: 48
End: 2023-04-28

## 2023-04-28 ENCOUNTER — OFFICE VISIT (OUTPATIENT)
Dept: FAMILY MEDICINE CLINIC | Facility: CLINIC | Age: 48
End: 2023-04-28
Payer: COMMERCIAL

## 2023-04-28 VITALS
WEIGHT: 269 LBS | BODY MASS INDEX: 47.66 KG/M2 | TEMPERATURE: 98 F | HEART RATE: 73 BPM | HEIGHT: 63 IN | SYSTOLIC BLOOD PRESSURE: 122 MMHG | OXYGEN SATURATION: 98 % | RESPIRATION RATE: 16 BRPM | DIASTOLIC BLOOD PRESSURE: 82 MMHG

## 2023-04-28 DIAGNOSIS — Z12.11 SCREEN FOR COLON CANCER: ICD-10-CM

## 2023-04-28 DIAGNOSIS — F41.1 GENERALIZED ANXIETY DISORDER: ICD-10-CM

## 2023-04-28 DIAGNOSIS — R73.03 PREDIABETES: ICD-10-CM

## 2023-04-28 DIAGNOSIS — Z00.00 ANNUAL PHYSICAL EXAM: Primary | ICD-10-CM

## 2023-04-28 DIAGNOSIS — I10 ESSENTIAL HYPERTENSION, BENIGN: ICD-10-CM

## 2023-04-28 PROCEDURE — 3079F DIAST BP 80-89 MM HG: CPT | Performed by: FAMILY MEDICINE

## 2023-04-28 PROCEDURE — 99396 PREV VISIT EST AGE 40-64: CPT | Performed by: FAMILY MEDICINE

## 2023-04-28 PROCEDURE — 99213 OFFICE O/P EST LOW 20 MIN: CPT | Performed by: FAMILY MEDICINE

## 2023-04-28 PROCEDURE — 3074F SYST BP LT 130 MM HG: CPT | Performed by: FAMILY MEDICINE

## 2023-04-28 PROCEDURE — 3008F BODY MASS INDEX DOCD: CPT | Performed by: FAMILY MEDICINE

## 2023-04-28 RX ORDER — ACETAMINOPHEN AND CODEINE PHOSPHATE 120; 12 MG/5ML; MG/5ML
0.35 SOLUTION ORAL DAILY
COMMUNITY
Start: 2023-04-26

## 2023-04-28 RX ORDER — ALPRAZOLAM 0.25 MG/1
0.25 TABLET ORAL DAILY PRN
Qty: 5 TABLET | Refills: 0 | Status: SHIPPED | OUTPATIENT
Start: 2023-04-28

## 2023-04-28 NOTE — TELEPHONE ENCOUNTER
From: Vimal Ashton  To: Buddy Stephens MD  Sent: 4/28/2023 3:00 PM CDT  Subject: Lab Results     Attached please find lab results completed at [facility] in March.

## 2023-04-29 ENCOUNTER — MED REC SCAN ONLY (OUTPATIENT)
Dept: FAMILY MEDICINE CLINIC | Facility: CLINIC | Age: 48
End: 2023-04-29

## 2023-05-03 RX ORDER — OLMESARTAN MEDOXOMIL AND HYDROCHLOROTHIAZIDE 20/12.5 20; 12.5 MG/1; MG/1
1 TABLET ORAL DAILY
Qty: 90 TABLET | Refills: 0 | Status: SHIPPED | OUTPATIENT
Start: 2023-05-03 | End: 2023-05-05

## 2023-05-06 RX ORDER — OLMESARTAN MEDOXOMIL AND HYDROCHLOROTHIAZIDE 20/12.5 20; 12.5 MG/1; MG/1
1 TABLET ORAL DAILY
Qty: 30 TABLET | Refills: 0 | Status: SHIPPED | OUTPATIENT
Start: 2023-05-06 | End: 2023-06-05

## 2023-05-06 RX ORDER — OLMESARTAN MEDOXOMIL AND HYDROCHLOROTHIAZIDE 20/12.5 20; 12.5 MG/1; MG/1
1 TABLET ORAL DAILY
Qty: 90 TABLET | Refills: 0 | OUTPATIENT
Start: 2023-05-06 | End: 2023-06-05

## 2023-06-03 NOTE — TELEPHONE ENCOUNTER
Last visit 04/28/2023  Last refill 05/06/2023    Hypertension Medications Protocol Failed 06/02/2023 10:22 PM   Protocol Details  CMP or BMP in past 12 months    Last serum creatinine< 2.0    Appointment in past 6 or next 3 months

## 2023-06-05 RX ORDER — OLMESARTAN MEDOXOMIL AND HYDROCHLOROTHIAZIDE 20/12.5 20; 12.5 MG/1; MG/1
1 TABLET ORAL DAILY
Qty: 30 TABLET | Refills: 3 | Status: SHIPPED | OUTPATIENT
Start: 2023-06-05 | End: 2023-07-05

## 2023-06-15 RX ORDER — ALBUTEROL SULFATE 90 UG/1
2 AEROSOL, METERED RESPIRATORY (INHALATION) EVERY 4 HOURS PRN
Qty: 1 EACH | Refills: 2 | Status: SHIPPED | OUTPATIENT
Start: 2023-06-15

## 2023-07-18 RX ORDER — BECLOMETHASONE DIPROPIONATE HFA 40 UG/1
AEROSOL, METERED RESPIRATORY (INHALATION)
Qty: 10.6 G | Refills: 0 | Status: SHIPPED | OUTPATIENT
Start: 2023-07-18

## 2023-09-02 RX ORDER — OLMESARTAN MEDOXOMIL AND HYDROCHLOROTHIAZIDE 20/12.5 20; 12.5 MG/1; MG/1
1 TABLET ORAL DAILY
Qty: 90 TABLET | Refills: 0 | Status: SHIPPED | OUTPATIENT
Start: 2023-09-02 | End: 2023-12-01

## 2023-10-09 RX ORDER — BECLOMETHASONE DIPROPIONATE HFA 40 UG/1
AEROSOL, METERED RESPIRATORY (INHALATION)
Qty: 10.6 G | Refills: 0 | Status: SHIPPED | OUTPATIENT
Start: 2023-10-09

## 2024-01-10 ENCOUNTER — TELEPHONE (OUTPATIENT)
Dept: FAMILY MEDICINE CLINIC | Facility: CLINIC | Age: 49
End: 2024-01-10

## 2024-01-10 ENCOUNTER — OFFICE VISIT (OUTPATIENT)
Dept: FAMILY MEDICINE CLINIC | Facility: CLINIC | Age: 49
End: 2024-01-10
Payer: COMMERCIAL

## 2024-01-10 ENCOUNTER — LAB ENCOUNTER (OUTPATIENT)
Dept: LAB | Age: 49
End: 2024-01-10
Attending: NURSE PRACTITIONER
Payer: COMMERCIAL

## 2024-01-10 VITALS
WEIGHT: 276 LBS | SYSTOLIC BLOOD PRESSURE: 126 MMHG | TEMPERATURE: 98 F | BODY MASS INDEX: 49 KG/M2 | HEART RATE: 75 BPM | OXYGEN SATURATION: 96 % | DIASTOLIC BLOOD PRESSURE: 84 MMHG

## 2024-01-10 DIAGNOSIS — R73.03 PREDIABETES: ICD-10-CM

## 2024-01-10 DIAGNOSIS — J45.30 MILD PERSISTENT REACTIVE AIRWAY DISEASE WITHOUT COMPLICATION: ICD-10-CM

## 2024-01-10 DIAGNOSIS — N92.6 IRREGULAR MENSES: ICD-10-CM

## 2024-01-10 DIAGNOSIS — I10 ESSENTIAL HYPERTENSION, BENIGN: Primary | ICD-10-CM

## 2024-01-10 DIAGNOSIS — B37.2 CUTANEOUS CANDIDIASIS: ICD-10-CM

## 2024-01-10 DIAGNOSIS — I10 ESSENTIAL HYPERTENSION, BENIGN: ICD-10-CM

## 2024-01-10 DIAGNOSIS — R73.03 PREDIABETES: Primary | ICD-10-CM

## 2024-01-10 PROBLEM — J45.909 REACTIVE AIRWAY DISEASE: Status: ACTIVE | Noted: 2024-01-10

## 2024-01-10 PROBLEM — J45.909 REACTIVE AIRWAY DISEASE (HCC): Status: ACTIVE | Noted: 2024-01-10

## 2024-01-10 LAB
ALBUMIN SERPL-MCNC: 3.7 G/DL (ref 3.4–5)
ALBUMIN/GLOB SERPL: 1 {RATIO} (ref 1–2)
ALP LIVER SERPL-CCNC: 61 U/L
ALT SERPL-CCNC: 22 U/L
ANION GAP SERPL CALC-SCNC: 2 MMOL/L (ref 0–18)
AST SERPL-CCNC: 11 U/L (ref 15–37)
BASOPHILS # BLD AUTO: 0.07 X10(3) UL (ref 0–0.2)
BASOPHILS NFR BLD AUTO: 0.8 %
BILIRUB SERPL-MCNC: 0.6 MG/DL (ref 0.1–2)
BUN BLD-MCNC: 17 MG/DL (ref 9–23)
CALCIUM BLD-MCNC: 9 MG/DL (ref 8.5–10.1)
CHLORIDE SERPL-SCNC: 107 MMOL/L (ref 98–112)
CHOLEST SERPL-MCNC: 140 MG/DL (ref ?–200)
CO2 SERPL-SCNC: 29 MMOL/L (ref 21–32)
CREAT BLD-MCNC: 0.67 MG/DL
EGFRCR SERPLBLD CKD-EPI 2021: 108 ML/MIN/1.73M2 (ref 60–?)
EOSINOPHIL # BLD AUTO: 0.13 X10(3) UL (ref 0–0.7)
EOSINOPHIL NFR BLD AUTO: 1.6 %
ERYTHROCYTE [DISTWIDTH] IN BLOOD BY AUTOMATED COUNT: 13.6 %
EST. AVERAGE GLUCOSE BLD GHB EST-MCNC: 126 MG/DL (ref 68–126)
FASTING PATIENT LIPID ANSWER: YES
FASTING STATUS PATIENT QL REPORTED: YES
GLOBULIN PLAS-MCNC: 3.6 G/DL (ref 2.8–4.4)
GLUCOSE BLD-MCNC: 95 MG/DL (ref 70–99)
HBA1C MFR BLD: 6 % (ref ?–5.7)
HCT VFR BLD AUTO: 40.7 %
HDLC SERPL-MCNC: 50 MG/DL (ref 40–59)
HGB BLD-MCNC: 13 G/DL
IMM GRANULOCYTES # BLD AUTO: 0.02 X10(3) UL (ref 0–1)
IMM GRANULOCYTES NFR BLD: 0.2 %
LDLC SERPL CALC-MCNC: 74 MG/DL (ref ?–100)
LYMPHOCYTES # BLD AUTO: 2.33 X10(3) UL (ref 1–4)
LYMPHOCYTES NFR BLD AUTO: 28.2 %
MCH RBC QN AUTO: 27.9 PG (ref 26–34)
MCHC RBC AUTO-ENTMCNC: 31.9 G/DL (ref 31–37)
MCV RBC AUTO: 87.3 FL
MONOCYTES # BLD AUTO: 0.57 X10(3) UL (ref 0.1–1)
MONOCYTES NFR BLD AUTO: 6.9 %
NEUTROPHILS # BLD AUTO: 5.15 X10 (3) UL (ref 1.5–7.7)
NEUTROPHILS # BLD AUTO: 5.15 X10(3) UL (ref 1.5–7.7)
NEUTROPHILS NFR BLD AUTO: 62.3 %
NONHDLC SERPL-MCNC: 90 MG/DL (ref ?–130)
OSMOLALITY SERPL CALC.SUM OF ELEC: 287 MOSM/KG (ref 275–295)
PLATELET # BLD AUTO: 317 10(3)UL (ref 150–450)
POTASSIUM SERPL-SCNC: 3.7 MMOL/L (ref 3.5–5.1)
PROT SERPL-MCNC: 7.3 G/DL (ref 6.4–8.2)
RBC # BLD AUTO: 4.66 X10(6)UL
SODIUM SERPL-SCNC: 138 MMOL/L (ref 136–145)
T4 FREE SERPL-MCNC: 1.1 NG/DL (ref 0.8–1.7)
TRIGL SERPL-MCNC: 80 MG/DL (ref 30–149)
TSI SER-ACNC: 2.51 MIU/ML (ref 0.36–3.74)
VLDLC SERPL CALC-MCNC: 12 MG/DL (ref 0–30)
WBC # BLD AUTO: 8.3 X10(3) UL (ref 4–11)

## 2024-01-10 PROCEDURE — 80053 COMPREHEN METABOLIC PANEL: CPT

## 2024-01-10 PROCEDURE — 85025 COMPLETE CBC W/AUTO DIFF WBC: CPT

## 2024-01-10 PROCEDURE — 84439 ASSAY OF FREE THYROXINE: CPT

## 2024-01-10 PROCEDURE — 99214 OFFICE O/P EST MOD 30 MIN: CPT | Performed by: NURSE PRACTITIONER

## 2024-01-10 PROCEDURE — 3079F DIAST BP 80-89 MM HG: CPT | Performed by: NURSE PRACTITIONER

## 2024-01-10 PROCEDURE — 84443 ASSAY THYROID STIM HORMONE: CPT

## 2024-01-10 PROCEDURE — 83036 HEMOGLOBIN GLYCOSYLATED A1C: CPT

## 2024-01-10 PROCEDURE — 3074F SYST BP LT 130 MM HG: CPT | Performed by: NURSE PRACTITIONER

## 2024-01-10 PROCEDURE — 80061 LIPID PANEL: CPT

## 2024-01-10 RX ORDER — OLMESARTAN MEDOXOMIL AND HYDROCHLOROTHIAZIDE 20/12.5 20; 12.5 MG/1; MG/1
1 TABLET ORAL DAILY
Qty: 90 TABLET | Refills: 1 | Status: SHIPPED | OUTPATIENT
Start: 2024-01-10 | End: 2024-04-09

## 2024-01-10 RX ORDER — TRIAMCINOLONE ACETONIDE 5 MG/G
1 CREAM TOPICAL 3 TIMES DAILY
Qty: 15 G | Refills: 0 | Status: SHIPPED | OUTPATIENT
Start: 2024-01-10 | End: 2024-01-15

## 2024-01-10 RX ORDER — BECLOMETHASONE DIPROPIONATE HFA 40 UG/1
2 AEROSOL, METERED RESPIRATORY (INHALATION) DAILY
Qty: 10.6 G | Refills: 3 | Status: SHIPPED | OUTPATIENT
Start: 2024-01-10

## 2024-01-10 RX ORDER — NYSTATIN 100000 U/G
1 CREAM TOPICAL 3 TIMES DAILY
Qty: 15 G | Refills: 0 | Status: SHIPPED | OUTPATIENT
Start: 2024-01-10 | End: 2024-01-15

## 2024-01-10 RX ORDER — OLMESARTAN MEDOXOMIL AND HYDROCHLOROTHIAZIDE 20/12.5 20; 12.5 MG/1; MG/1
TABLET ORAL
COMMUNITY
Start: 2024-01-05 | End: 2024-01-10

## 2024-01-10 NOTE — PROGRESS NOTES
HPI:   Patient is here for follow up and medication refills. Overall feeling well. Denies medication side effects. Asthma has been well-controlled. Has noticed an abnormal odor in belly button. Tried cleaning with antibacterial soap with no relief. It is also slightly irritated.     Current Outpatient Medications   Medication Sig Dispense Refill    Olmesartan Medoxomil-HCTZ 20-12.5 MG Oral Tab Take 1 tablet by mouth daily. 90 tablet 1    Beclomethasone Diprop HFA (QVAR REDIHALER) 40 MCG/ACT Inhalation Aerosol, Breath Activated Inhale 2 puffs into the lungs daily. 10.6 g 3    nystatin-triamcinolone 100,000-0.1 Units/g-% External Cream Apply 1 Application topically 3 (three) times daily for 5 days. 15 g 0    albuterol (PROAIR HFA) 108 (90 Base) MCG/ACT Inhalation Aero Soln Inhale 2 puffs into the lungs every 4 (four) hours as needed. 1 each 2    Norethindrone 0.35 MG Oral Tab Take 1 tablet (0.35 mg total) by mouth daily.      ALPRAZolam (XANAX) 0.25 MG Oral Tab Take 1 tablet (0.25 mg total) by mouth daily as needed for Anxiety. 5 tablet 0    Nebulizer Does not apply Misc Nebulizer and tubing 1 each 0    Ferrous Sulfate (SLOW FE OR) Take by mouth.      Loratadine (CLARITIN OR)       Cholecalciferol (VITAMIN D) 50 MCG (2000 UT) Oral Tab Take by mouth.        Past Medical History:   Diagnosis Date    Abnormal Pap smear of cervix     Acute bronchitis 5/9/2011    Allergic rhinitis, cause unspecified 4/23/2012    Anemia 02/28/11    changed on 3/3/11 to Iron Deficiency Anemia    Anxiety state     Backache, unspecified 1/3/2011    DIABETES     Diverticulosis of large intestine     Dysfunction of eustachian tube 2/27/2012    Essential hypertension, benign 4/23/2012    Generalized anxiety disorder 1/21/2010    High blood pressure     HYPERTENSION     Obesity     Other malaise and fatigue 1/10/2011    Other symptoms referable to back 1/3/2011    Premenstrual tension syndromes 5/9/2011    Sleep apnea     Sprain of lumbar region  1/3/2011    Unspecified essential hypertension     in pregnancy      Past Surgical History:   Procedure Laterality Date          in  and       Family History   Problem Relation Age of Onset    Diabetes Mother     Heart Disease Maternal Grandmother         maternal grandmother  from CHF    Diabetes Maternal Grandmother     Diabetes Other       Social History     Socioeconomic History    Marital status:     Number of children: 2   Occupational History    Occupation:    Tobacco Use    Smoking status: Never    Smokeless tobacco: Never    Tobacco comments:     non-smoker   Vaping Use    Vaping Use: Never used   Substance and Sexual Activity    Alcohol use: Yes     Alcohol/week: 0.0 standard drinks of alcohol     Comment: RARE    Drug use: No     Comment: no    Sexual activity: Yes     Partners: Male     Birth control/protection: Vasectomy   Other Topics Concern    Caffeine Concern Yes     Comment: 3 cups coffee/soda    Exercise No   Social History Narrative    Irma lives with her spouse. She lives at home in [place]. She is  to Primitivo with 2 children. Primitivo is in good health, has asthma. Irma's Congregational is [employer]. Additional social history reported by Irma is: Irma was born in [place], [place]         REVIEW OF SYSTEMS:   Review of Systems   Constitutional:  Negative for chills, fatigue and fever.   Respiratory:  Negative for cough, chest tightness, shortness of breath and wheezing.    Cardiovascular:  Negative for chest pain, palpitations and leg swelling.   Endocrine: Negative for polydipsia, polyphagia and polyuria.   Genitourinary:  Positive for menstrual problem (irregular).   Skin:  Positive for rash.       EXAM:   /84   Pulse 75   Temp 97.9 °F (36.6 °C)   Wt 276 lb (125.2 kg)   LMP 2023   SpO2 96%   BMI 48.89 kg/m²   Physical Exam  Constitutional:       General: She is not in acute distress.     Appearance: Normal appearance. She is  obese.   Cardiovascular:      Rate and Rhythm: Normal rate and regular rhythm.      Heart sounds: Normal heart sounds. No murmur heard.  Pulmonary:      Effort: Pulmonary effort is normal.      Breath sounds: Normal breath sounds.   Skin:         Neurological:      Mental Status: She is alert.         ASSESSMENT AND PLAN:   Diagnoses and all orders for this visit:    Essential hypertension, benign  -     Olmesartan Medoxomil-HCTZ 20-12.5 MG Oral Tab; Take 1 tablet by mouth daily.  -     Comp Metabolic Panel (14) [E]; Future    Prediabetes  -     Lipid Panel [E]; Future  -     Hemoglobin A1C [E]; Future    Mild persistent reactive airway disease without complication  -     Beclomethasone Diprop HFA (QVAR REDIHALER) 40 MCG/ACT Inhalation Aerosol, Breath Activated; Inhale 2 puffs into the lungs daily.    Irregular menses  -     TSH and Free T4 [E]; Future  -     CBC W Differential W Platelet [E]; Future    Cutaneous candidiasis  -     nystatin-triamcinolone 100,000-0.1 Units/g-% External Cream; Apply 1 Application topically 3 (three) times daily for 5 days.    Meds refilled  Checking fasting labs  Topical treatment for rash

## 2024-03-05 DIAGNOSIS — J45.30 MILD PERSISTENT REACTIVE AIRWAY DISEASE WITHOUT COMPLICATION (HCC): ICD-10-CM

## 2024-03-05 RX ORDER — BECLOMETHASONE DIPROPIONATE HFA 40 UG/1
2 AEROSOL, METERED RESPIRATORY (INHALATION) DAILY
Qty: 10.6 G | Refills: 3 | Status: SHIPPED | OUTPATIENT
Start: 2024-03-05 | End: 2024-03-11

## 2024-03-11 DIAGNOSIS — I10 ESSENTIAL HYPERTENSION, BENIGN: ICD-10-CM

## 2024-03-11 DIAGNOSIS — J45.30 MILD PERSISTENT REACTIVE AIRWAY DISEASE WITHOUT COMPLICATION (HCC): ICD-10-CM

## 2024-03-11 RX ORDER — BECLOMETHASONE DIPROPIONATE HFA 40 UG/1
2 AEROSOL, METERED RESPIRATORY (INHALATION) DAILY
Qty: 10.6 G | Refills: 0 | Status: SHIPPED | OUTPATIENT
Start: 2024-03-11

## 2024-03-11 RX ORDER — OLMESARTAN MEDOXOMIL AND HYDROCHLOROTHIAZIDE 20/12.5 20; 12.5 MG/1; MG/1
1 TABLET ORAL DAILY
Qty: 90 TABLET | Refills: 0 | Status: SHIPPED | OUTPATIENT
Start: 2024-03-11 | End: 2024-09-07

## 2024-03-11 RX ORDER — OLMESARTAN MEDOXOMIL AND HYDROCHLOROTHIAZIDE 20/12.5 20; 12.5 MG/1; MG/1
1 TABLET ORAL DAILY
Qty: 90 TABLET | Refills: 0 | Status: SHIPPED | OUTPATIENT
Start: 2024-03-11 | End: 2024-03-11

## 2024-03-11 NOTE — TELEPHONE ENCOUNTER
Asthma & COPD Medication Protocol Passed03/11/2024 12:31 PM   Protocol Details Asthma Action Score greater than or equal to 20    Appointment in past 6 or next 3 months    AAP/ACT given in last 12 months          Hypertension Medications Protocol Passed03/11/2024 12:31 PM   Protocol Details CMP or BMP in past 12 months    Last BP reading less than 140/90    In person appointment or virtual visit in the past 12 mos or appointment in next 3 mos    EGFRCR or GFRNAA > 50

## 2024-04-09 DIAGNOSIS — J45.30 MILD PERSISTENT REACTIVE AIRWAY DISEASE WITHOUT COMPLICATION (HCC): ICD-10-CM

## 2024-04-09 RX ORDER — BECLOMETHASONE DIPROPIONATE HFA 40 UG/1
2 AEROSOL, METERED RESPIRATORY (INHALATION) DAILY
Qty: 10.6 G | Refills: 0 | Status: SHIPPED | OUTPATIENT
Start: 2024-04-09

## 2024-04-09 NOTE — TELEPHONE ENCOUNTER
Last OV 1/10/24  Last refilled on 3/11/24 for # 1 with 0 refills  No future appointments.     Thank you.

## 2024-05-14 ENCOUNTER — OFFICE VISIT (OUTPATIENT)
Dept: FAMILY MEDICINE CLINIC | Facility: CLINIC | Age: 49
End: 2024-05-14

## 2024-05-14 VITALS
RESPIRATION RATE: 18 BRPM | TEMPERATURE: 98 F | SYSTOLIC BLOOD PRESSURE: 125 MMHG | DIASTOLIC BLOOD PRESSURE: 76 MMHG | HEART RATE: 78 BPM | OXYGEN SATURATION: 98 %

## 2024-05-14 DIAGNOSIS — J01.00 ACUTE NON-RECURRENT MAXILLARY SINUSITIS: Primary | ICD-10-CM

## 2024-05-14 PROCEDURE — 3074F SYST BP LT 130 MM HG: CPT | Performed by: NURSE PRACTITIONER

## 2024-05-14 PROCEDURE — 99213 OFFICE O/P EST LOW 20 MIN: CPT | Performed by: NURSE PRACTITIONER

## 2024-05-14 PROCEDURE — 3078F DIAST BP <80 MM HG: CPT | Performed by: NURSE PRACTITIONER

## 2024-05-14 RX ORDER — FLUTICASONE PROPIONATE 50 MCG
2 SPRAY, SUSPENSION (ML) NASAL DAILY
Qty: 48 G | Refills: 0 | OUTPATIENT
Start: 2024-05-14

## 2024-05-14 RX ORDER — AMOXICILLIN AND CLAVULANATE POTASSIUM 875; 125 MG/1; MG/1
1 TABLET, FILM COATED ORAL 2 TIMES DAILY
Qty: 14 TABLET | Refills: 0 | Status: SHIPPED | OUTPATIENT
Start: 2024-05-14 | End: 2024-05-21

## 2024-05-14 RX ORDER — FLUTICASONE PROPIONATE 50 MCG
2 SPRAY, SUSPENSION (ML) NASAL DAILY
Qty: 1 EACH | Refills: 0 | Status: SHIPPED | OUTPATIENT
Start: 2024-05-14

## 2024-05-14 NOTE — PROGRESS NOTES
CHIEF COMPLAINT:     Chief Complaint   Patient presents with    Sinus Problem     Entered by patient       HPI:   Irma Ortez is a 49 year old female who presents for cold symptoms for over 1 week. States seemed to be improving for a couple days, but then 2-3 days ago began to worsen again with increased sinus congestion and pressure. Treating symptoms with advil cold and sinus.      Current Outpatient Medications   Medication Sig Dispense Refill    Beclomethasone Diprop HFA (QVAR REDIHALER) 40 MCG/ACT Inhalation Aerosol, Breath Activated Inhale 2 puffs into the lungs daily. 10.6 g 0    Olmesartan Medoxomil-HCTZ 20-12.5 MG Oral Tab Take 1 tablet by mouth daily. 90 tablet 0    albuterol (PROAIR HFA) 108 (90 Base) MCG/ACT Inhalation Aero Soln Inhale 2 puffs into the lungs every 4 (four) hours as needed. 1 each 2    Norethindrone 0.35 MG Oral Tab Take 1 tablet (0.35 mg total) by mouth daily.      ALPRAZolam (XANAX) 0.25 MG Oral Tab Take 1 tablet (0.25 mg total) by mouth daily as needed for Anxiety. 5 tablet 0    Nebulizer Does not apply Misc Nebulizer and tubing 1 each 0    Ferrous Sulfate (SLOW FE OR) Take by mouth.      Loratadine (CLARITIN OR)       Cholecalciferol (VITAMIN D) 50 MCG (2000 UT) Oral Tab Take by mouth.        Past Medical History:    Abnormal Pap smear of cervix    Acute bronchitis    Allergic rhinitis, cause unspecified    Anemia    changed on 3/3/11 to Iron Deficiency Anemia    Anxiety state    Backache, unspecified    DIABETES    Diverticulosis of large intestine    Dysfunction of eustachian tube    Essential hypertension, benign    Generalized anxiety disorder    High blood pressure    HYPERTENSION    Obesity    Other malaise and fatigue    Other symptoms referable to back    Premenstrual tension syndromes    Sleep apnea    Sprain of lumbar region    Unspecified essential hypertension    in pregnancy      Past Surgical History:   Procedure Laterality Date          in  and           Social History     Socioeconomic History    Marital status:     Number of children: 2   Occupational History    Occupation:    Tobacco Use    Smoking status: Never    Smokeless tobacco: Never    Tobacco comments:     non-smoker   Vaping Use    Vaping status: Never Used   Substance and Sexual Activity    Alcohol use: Yes     Alcohol/week: 0.0 standard drinks of alcohol     Comment: RARE    Drug use: No     Comment: no    Sexual activity: Yes     Partners: Male     Birth control/protection: Vasectomy   Other Topics Concern    Caffeine Concern Yes     Comment: 3 cups coffee/soda    Exercise No   Social History Narrative    Irma lives with her spouse. She lives at home in [place]. She is  to Primitivo with 2 children. Primitivo is in good health, has asthma. Irma's Scientologist is [employer]. Additional social history reported by Irma is: Irma was born in [place], [place]         REVIEW OF SYSTEMS:   GENERAL: normal appetite. No fever  SKIN: no rashes or abnormal skin lesions  HEENT: See HPI  LUNGS: denies shortness of breath or wheezing, See HPI  CARDIOVASCULAR: denies chest pain or palpitations   GI: denies N/V/C or abdominal pain  NEURO: Denies dizziness or weakness    EXAM:   /76   Pulse 78   Temp 98.1 °F (36.7 °C) (Oral)   Resp 18   LMP 05/12/2024   SpO2 98%   GENERAL: well developed, well nourished,in no apparent distress  SKIN: no rashes,no suspicious lesions  HEAD: atraumatic, normocephalic.  + tenderness on palpation of maxillary sinuses  EYES: conjunctiva clear, EOM intact  EARS: TM's gray, no bulging, no retraction,+ fluid,   NOSE: Nostrils patent, + nasal discharge, nasal mucosa inflamed   THROAT: Oral mucosa pink, moist. Posterior pharynx is non erythematous.   NECK: Supple, non-tender  LUNGS: clear to auscultation bilaterally, no wheezes or rhonchi. Breathing is non labored.  CARDIO: RRR without murmur  EXTREMITIES: no cyanosis, clubbing or edema  LYMPH:  + anterior  cervical lymphadenopathy.        ASSESSMENT AND PLAN:   Irma Ortez is a 49 year old female who presents with upper respiratory symptoms that are consistent with    ASSESSMENT:   Encounter Diagnosis   Name Primary?    Acute non-recurrent maxillary sinusitis Yes         PLAN:   Meds as below.   Comfort care per pt instructions.   To follow up for any new or worsening symptoms or if not improving the next few days.       Meds & Refills for this Visit:  Requested Prescriptions     Signed Prescriptions Disp Refills    amoxicillin clavulanate 875-125 MG Oral Tab 14 tablet 0     Sig: Take 1 tablet by mouth 2 (two) times daily for 7 days.    fluticasone propionate 50 MCG/ACT Nasal Suspension 1 each 0     Si sprays by Each Nare route daily.       Risks, benefits, and side effects of medication explained and discussed.    Patient Instructions   Antibiotic and flonase as prescribed  Push fluids and rest  Follow up with ENT if not improving over the next 4-5 days  Seek sooner follow up for new or worsening symptoms.     The patient indicates understanding of these issues and agrees to the plan.  The patient is asked to return if sx's persist or worsen.

## 2024-05-14 NOTE — PATIENT INSTRUCTIONS
Antibiotic and flonase as prescribed  Push fluids and rest  Follow up with ENT if not improving over the next 4-5 days  Seek sooner follow up for new or worsening symptoms.

## 2024-07-06 DIAGNOSIS — I10 ESSENTIAL HYPERTENSION, BENIGN: ICD-10-CM

## 2024-07-06 RX ORDER — OLMESARTAN MEDOXOMIL AND HYDROCHLOROTHIAZIDE 20/12.5 20; 12.5 MG/1; MG/1
1 TABLET ORAL DAILY
Qty: 90 TABLET | Refills: 0 | Status: SHIPPED | OUTPATIENT
Start: 2024-07-06

## 2024-07-06 NOTE — TELEPHONE ENCOUNTER
Last Office Visit: 1/10/24  Last Refill: 3/11/24  Last Labs: 1/10/24    Patient is due for physical and med follow up. Please schedule

## 2024-07-08 NOTE — TELEPHONE ENCOUNTER
Future Appointments   Date Time Provider Department Center   8/28/2024  8:00 AM Carleen Robison APRN [facility]

## 2024-09-03 NOTE — PROGRESS NOTES
Chief Complaint   Patient presents with    Physical     No concerns     Care Gap Management     Mammogram   Colorectal cancer screening          HPI  Irma Ortez is a 49 year old female here for physical.      Last colon cancer screen: never done    Last mammo: 2024, repeat done at [facility]    Last Pap: 2022, Negative HPV, +ASCUS  Followed up with Ob/gyn 2024    Acute concerns: none     F/u anxiety: has not used xanax in a year. Reports worsening anxiety due to family stressors. Denies SI/HI    F/u weight: healthy diet, portion control, walking for exercise and stationary bike. Lost 30lbs in the last year .    Discussed:  - diet: healthy diet   - exercise: walking, stationary bike   - sleep: melatonin for sleep  - stress: no concerns  - gyne: LMP: , heavy periods, lasts 5-7 days. Sees ob/gyn  - vision: UTD  - dentist visit: UTD      ROS  As per HPI        Depression Screening (PHQ-2/PHQ-9): Over the LAST 2 WEEKS   Little interest or pleasure in doing things: Not at all    Feeling down, depressed, or hopeless: Not at all    PHQ-2 SCORE: 0          Past Medical History:    Abnormal Pap smear of cervix    Acute bronchitis    Allergic rhinitis, cause unspecified    Anemia    changed on 3/3/11 to Iron Deficiency Anemia    Anxiety state    Backache, unspecified    DIABETES    Diverticulosis of large intestine    Dysfunction of eustachian tube    Essential hypertension, benign    Generalized anxiety disorder    High blood pressure    HYPERTENSION    Obesity    Other malaise and fatigue    Other symptoms referable to back    Premenstrual tension syndromes    Sleep apnea    Sprain of lumbar region    Unspecified essential hypertension    in pregnancy       Past Surgical History:   Procedure Laterality Date          in  and        Social History     Socioeconomic History    Marital status:     Number of children: 2   Occupational History    Occupation: Human Resource  Manager   Tobacco Use    Smoking status: Never    Smokeless tobacco: Never    Tobacco comments:     non-smoker   Vaping Use    Vaping status: Never Used   Substance and Sexual Activity    Alcohol use: Yes     Alcohol/week: 0.0 standard drinks of alcohol     Comment: RARE    Drug use: No     Comment: no    Sexual activity: Yes     Partners: Male     Birth control/protection: Vasectomy   Other Topics Concern    Caffeine Concern Yes     Comment: 3 cups coffee/soda    Exercise No       Family History   Problem Relation Age of Onset    Diabetes Mother     Heart Disease Maternal Grandmother         maternal grandmother  from CHF    Diabetes Maternal Grandmother     Diabetes Other         Current Outpatient Medications on File Prior to Visit   Medication Sig Dispense Refill    Olmesartan Medoxomil-HCTZ 20-12.5 MG Oral Tab TAKE 1 TABLET DAILY 90 tablet 0    fluticasone propionate 50 MCG/ACT Nasal Suspension 2 sprays by Each Nare route daily. 1 each 0    Beclomethasone Diprop HFA (QVAR REDIHALER) 40 MCG/ACT Inhalation Aerosol, Breath Activated Inhale 2 puffs into the lungs daily. 10.6 g 0    albuterol (PROAIR HFA) 108 (90 Base) MCG/ACT Inhalation Aero Soln Inhale 2 puffs into the lungs every 4 (four) hours as needed. 1 each 2    Norethindrone 0.35 MG Oral Tab Take 1 tablet (0.35 mg total) by mouth daily.      ALPRAZolam (XANAX) 0.25 MG Oral Tab Take 1 tablet (0.25 mg total) by mouth daily as needed for Anxiety. 5 tablet 0    Loratadine (CLARITIN OR)       Cholecalciferol (VITAMIN D) 50 MCG (2000 UT) Oral Tab Take by mouth.      Nebulizer Does not apply Misc Nebulizer and tubing 1 each 0    Ferrous Sulfate (SLOW FE OR) Take by mouth. (Patient not taking: Reported on 2024)       No current facility-administered medications on file prior to visit.       Immunization History   Administered Date(s) Administered    Covid-19 Vaccine Pfizer 30 mcg/0.3 ml 2021, 2021, 2021    Covid-19 Vaccine Pfizer  Bivalent 30mcg/0.3mL 10/28/2022            Objective  Vitals:    09/04/24 0759   BP: 108/70   Pulse: 78   Resp: 18   Temp: 97 °F (36.1 °C)   SpO2: 98%   Weight: 268 lb 6.4 oz (121.7 kg)   Height: 5' 3\" (1.6 m)     Body mass index is 47.54 kg/m².    Physical Exam  Constitutional:       Appearance: obese   HEENT:      Head: Normocephalic and atraumatic.      Eyes: PERRLA no notable nystagmus     Ears: normal on observation     Nose: Nose normal.      Mouth: Mucous membranes are moist.      Neck: no masses no bruit  Cardiovascular:      Rate and Rhythm: Normal rate and regular rhythm.   Pulmonary:      Effort: Pulmonary effort is normal.      Breath sounds: Normal breath sounds.   Abdominal:      General: Bowel sounds are normal.      Palpations: Abdomen is soft. There is no mass.   Musculoskeletal:         General: Normal range of motion.      Cervical back: Normal range of motion.   Skin:     General: Skin is warm and dry.   Neurological:      General: No focal deficit present.      Mental Status: Alert and oriented to person, place, and time.   Psychiatric:         Mood and Affect: Mood normal.         Thought Content: Thought content normal.         Assessment and Plan  Irma was seen today for physical and care gap management.    Diagnoses and all orders for this visit:    Annual physical exam    BMI 45.0-49.9, adult (HCC)    Essential hypertension, benign    Prediabetes  -     Hemoglobin A1C [E]; Future    Generalized anxiety disorder  -     escitalopram 5 MG Oral Tab; Take 1 tablet (5 mg total) by mouth daily.    Mild intermittent reactive airway disease without complication (HCC)    BRAULIO (obstructive sleep apnea)    Screen for colon cancer  -     Cancel: Gastro Referral - In Network  -     Gastro Referral - In Network         Patient presents for annual exam  - General labs: recent orders reviewed; awaiting repeat A1C results   - RAD/seasonal allergies well controlled  - HTN, well controlled with current med   -  Prediabetes, controlled with diet. A1C pending  - Worsening anxiety, start escitalopram and f/u in 4-6 weeks   - Colonoscopy, referral given today  - Mammogram UTD  - Pap smear/ pelvic/ breast exam UTD  - BRAULIO improved with weight loss, not using cpap  - Discussed weight loss, healthy diet and 150 min aerobic exercise weekly. Discussed medication options. Pt will check with insurance what is covered.  - Discussed signing up for patient portal as means of communicating with me directly  - Discussed importance of communicating with me if has not heard back with results, etc  - Discussed age-appropriate vaccinations and screenings  - Pt verbalizes understanding, all questions/concerns addressed, in agreement w/plan        Follow up  Return in about 1 year (around 9/4/2025) for annual physical and 4-6 weeks for medication follow up.      Patient Instructions  Patient Instructions   It was nice to meet you!  I will reach out via [employer]     .At your doctor's office, we do not know what medications are covered by what insurance. We do need the patient's help in finding that out.     You need to call your insurance and ask them if you have coverage for anti-obesity medications. If yes, ask which ones they prefer. They will have a list of covered medications, called their formulary, that will list what they require for coverage.     A drug will either be EXCLUDED, COVERED, or COVERED, but requiring extra steps.     If EXCLUDED - your insurance does not cover this drug under any circumstances and no explanation from the provider will be able to get it covered. No appeal process will be completed if the medication is excluded.     If COVERED - your pharmacy will be able to provider you the medication with no extra steps needed. You will be responsible for deductibles or drug co-pays. Your pharmacy is the only one that can advise on this; provider offices do not know costs pertaining to your plan.     If COVERED, but requiring  extra steps - this means either a prior authorization is required, you are required to do step therapy, or there may be quantity limitations on the medication.     Your insurance has all of this information for every drug. Many plans allow you to sign on to their website to access your formulary as well, so that may be an option for you.     Please contact your insurance to find out what is covered for you and let us know so that we can determine the best medication treatment option for you.     Thank you,    MD Sandra Browning MD

## 2024-09-04 ENCOUNTER — LAB ENCOUNTER (OUTPATIENT)
Dept: LAB | Age: 49
End: 2024-09-04
Payer: COMMERCIAL

## 2024-09-04 ENCOUNTER — OFFICE VISIT (OUTPATIENT)
Dept: FAMILY MEDICINE CLINIC | Facility: CLINIC | Age: 49
End: 2024-09-04
Payer: COMMERCIAL

## 2024-09-04 VITALS
TEMPERATURE: 97 F | HEIGHT: 63 IN | OXYGEN SATURATION: 98 % | DIASTOLIC BLOOD PRESSURE: 70 MMHG | BODY MASS INDEX: 47.55 KG/M2 | HEART RATE: 78 BPM | SYSTOLIC BLOOD PRESSURE: 108 MMHG | WEIGHT: 268.38 LBS | RESPIRATION RATE: 18 BRPM

## 2024-09-04 DIAGNOSIS — G47.33 OSA (OBSTRUCTIVE SLEEP APNEA): ICD-10-CM

## 2024-09-04 DIAGNOSIS — F41.1 GENERALIZED ANXIETY DISORDER: ICD-10-CM

## 2024-09-04 DIAGNOSIS — R73.03 PREDIABETES: ICD-10-CM

## 2024-09-04 DIAGNOSIS — Z12.11 SCREEN FOR COLON CANCER: ICD-10-CM

## 2024-09-04 DIAGNOSIS — J45.20 MILD INTERMITTENT REACTIVE AIRWAY DISEASE WITHOUT COMPLICATION (HCC): ICD-10-CM

## 2024-09-04 DIAGNOSIS — Z00.00 ANNUAL PHYSICAL EXAM: Primary | ICD-10-CM

## 2024-09-04 DIAGNOSIS — I10 ESSENTIAL HYPERTENSION, BENIGN: ICD-10-CM

## 2024-09-04 PROBLEM — E66.01 MORBID (SEVERE) OBESITY DUE TO EXCESS CALORIES (HCC): Status: RESOLVED | Noted: 2022-02-05 | Resolved: 2024-09-04

## 2024-09-04 LAB
EST. AVERAGE GLUCOSE BLD GHB EST-MCNC: 123 MG/DL (ref 68–126)
HBA1C MFR BLD: 5.9 % (ref ?–5.7)

## 2024-09-04 PROCEDURE — 83036 HEMOGLOBIN GLYCOSYLATED A1C: CPT

## 2024-09-04 PROCEDURE — 3078F DIAST BP <80 MM HG: CPT

## 2024-09-04 PROCEDURE — 99396 PREV VISIT EST AGE 40-64: CPT

## 2024-09-04 PROCEDURE — 3074F SYST BP LT 130 MM HG: CPT

## 2024-09-04 PROCEDURE — 3008F BODY MASS INDEX DOCD: CPT

## 2024-09-04 RX ORDER — ESCITALOPRAM OXALATE 5 MG/1
5 TABLET ORAL DAILY
Qty: 30 TABLET | Refills: 0 | Status: SHIPPED | OUTPATIENT
Start: 2024-09-04

## 2024-09-04 NOTE — PATIENT INSTRUCTIONS
It was nice to meet you!  I will reach out via [employer]     .At your doctor's office, we do not know what medications are covered by what insurance. We do need the patient's help in finding that out.     You need to call your insurance and ask them if you have coverage for anti-obesity medications. If yes, ask which ones they prefer. They will have a list of covered medications, called their formulary, that will list what they require for coverage.     A drug will either be EXCLUDED, COVERED, or COVERED, but requiring extra steps.     If EXCLUDED - your insurance does not cover this drug under any circumstances and no explanation from the provider will be able to get it covered. No appeal process will be completed if the medication is excluded.     If COVERED - your pharmacy will be able to provider you the medication with no extra steps needed. You will be responsible for deductibles or drug co-pays. Your pharmacy is the only one that can advise on this; provider offices do not know costs pertaining to your plan.     If COVERED, but requiring extra steps - this means either a prior authorization is required, you are required to do step therapy, or there may be quantity limitations on the medication.     Your insurance has all of this information for every drug. Many plans allow you to sign on to their website to access your formulary as well, so that may be an option for you.     Please contact your insurance to find out what is covered for you and let us know so that we can determine the best medication treatment option for you.     Thank you,    Sandra Asif MD

## 2024-09-06 ENCOUNTER — TELEPHONE (OUTPATIENT)
Dept: FAMILY MEDICINE CLINIC | Facility: CLINIC | Age: 49
End: 2024-09-06

## 2024-09-06 DIAGNOSIS — R73.03 PREDIABETES: Primary | ICD-10-CM

## 2024-09-06 NOTE — TELEPHONE ENCOUNTER
----- Message from Sandra Asif sent at 9/6/2024 12:39 PM CDT -----  A1C improved from 6.0 to 5.9. Continue weight loss efforts, healthy diet and exercise. Repeat lab in 3-6 months.  Let us know when you check with insurance regarding weight loss medication

## 2024-09-28 DIAGNOSIS — I10 ESSENTIAL HYPERTENSION, BENIGN: ICD-10-CM

## 2024-09-30 RX ORDER — OLMESARTAN MEDOXOMIL AND HYDROCHLOROTHIAZIDE 20/12.5 20; 12.5 MG/1; MG/1
1 TABLET ORAL DAILY
Qty: 90 TABLET | Refills: 3 | Status: SHIPPED | OUTPATIENT
Start: 2024-09-30

## 2024-09-30 NOTE — TELEPHONE ENCOUNTER
Hypertension Medications Protocol [id number]/28/2024 01:03 PM   Protocol Details CMP or BMP in past 12 months    Last BP reading less than 140/90    In person appointment or virtual visit in the past 12 mos or appointment in next 3 mos    EGFRCR or GFRNAA > 50       Request for  OLMESARTAN MEDOXOMIL-HCTZ 20-12.5 MG Oral Tab       LOV 9/4/24 with    Last refill 7/3/24 - 90 tablets 0 refill   No future appointments.    Labs 9/4/24

## 2024-10-07 DIAGNOSIS — J45.30 MILD PERSISTENT REACTIVE AIRWAY DISEASE WITHOUT COMPLICATION (HCC): ICD-10-CM

## 2024-10-07 RX ORDER — BECLOMETHASONE DIPROPIONATE HFA 40 UG/1
2 AEROSOL, METERED RESPIRATORY (INHALATION) DAILY
Qty: 10.6 G | Refills: 0 | Status: SHIPPED | OUTPATIENT
Start: 2024-10-07

## 2024-10-07 NOTE — TELEPHONE ENCOUNTER
Received refill request for Qvar inhaler.    Last office visit:9/4/24  Last Labs:1/10/24  Last refill: 4/9/24    No future visits.

## 2024-10-09 ENCOUNTER — TELEPHONE (OUTPATIENT)
Dept: FAMILY MEDICINE CLINIC | Facility: CLINIC | Age: 49
End: 2024-10-09

## 2024-10-09 DIAGNOSIS — J45.30 MILD PERSISTENT REACTIVE AIRWAY DISEASE WITHOUT COMPLICATION (HCC): ICD-10-CM

## 2024-10-09 NOTE — TELEPHONE ENCOUNTER
Received fax from [employer] for refill request for Qvar  Prescription had been sent on 10/7/2024

## 2024-10-10 RX ORDER — BECLOMETHASONE DIPROPIONATE HFA 40 UG/1
2 AEROSOL, METERED RESPIRATORY (INHALATION) DAILY
Qty: 10.6 G | Refills: 0 | OUTPATIENT
Start: 2024-10-10

## 2024-11-18 RX ORDER — ALBUTEROL SULFATE 90 UG/1
2 INHALANT RESPIRATORY (INHALATION) EVERY 4 HOURS PRN
Qty: 8.5 G | Refills: 0 | Status: SHIPPED | OUTPATIENT
Start: 2024-11-18

## 2024-12-02 ENCOUNTER — TELEPHONE (OUTPATIENT)
Dept: FAMILY MEDICINE CLINIC | Facility: CLINIC | Age: 49
End: 2024-12-02

## 2024-12-02 ENCOUNTER — PATIENT MESSAGE (OUTPATIENT)
Dept: FAMILY MEDICINE CLINIC | Facility: CLINIC | Age: 49
End: 2024-12-02

## 2024-12-02 DIAGNOSIS — J45.20 MILD INTERMITTENT REACTIVE AIRWAY DISEASE WITHOUT COMPLICATION (HCC): Primary | ICD-10-CM

## 2024-12-02 RX ORDER — BUDESONIDE 90 UG/1
2 AEROSOL, POWDER RESPIRATORY (INHALATION)
Qty: 1 EACH | Refills: 0 | Status: SHIPPED | OUTPATIENT
Start: 2024-12-02

## 2024-12-26 RX ORDER — ALBUTEROL SULFATE 90 UG/1
2 INHALANT RESPIRATORY (INHALATION) EVERY 4 HOURS PRN
Qty: 8.5 G | Refills: 0 | Status: SHIPPED | OUTPATIENT
Start: 2024-12-26

## 2025-01-06 ENCOUNTER — TELEPHONE (OUTPATIENT)
Dept: FAMILY MEDICINE CLINIC | Facility: CLINIC | Age: 50
End: 2025-01-06

## 2025-01-31 LAB
AMB EXT CHOLESTEROL, TOTAL: 153 MG/DL
AMB EXT CMP ALT: 17 U/L
AMB EXT CMP AST: 18 U/L
AMB EXT CREATININE: 0.87 MG/DL
AMB EXT EGFR NON-AA: 101
AMB EXT GLUCOSE: 67 MG/DL
AMB EXT HDL CHOLESTEROL: 53 MG/DL
AMB EXT HGBA1C: 5.7 %
AMB EXT LDL CHOLESTEROL, DIRECT: 84 MG/DL
AMB EXT TRIGLYCERIDES: 74 MG/DL

## 2025-02-05 ENCOUNTER — PATIENT MESSAGE (OUTPATIENT)
Dept: FAMILY MEDICINE CLINIC | Facility: CLINIC | Age: 50
End: 2025-02-05

## 2025-02-10 DIAGNOSIS — J45.30 MILD PERSISTENT REACTIVE AIRWAY DISEASE WITHOUT COMPLICATION (HCC): ICD-10-CM

## 2025-02-10 RX ORDER — BECLOMETHASONE DIPROPIONATE HFA 40 UG/1
2 AEROSOL, METERED RESPIRATORY (INHALATION) DAILY
Qty: 10.6 G | Refills: 0 | Status: SHIPPED | OUTPATIENT
Start: 2025-02-10

## 2025-02-10 NOTE — TELEPHONE ENCOUNTER
Last office visit with Dr. Asif on 9/4/2024 for annual physical exam    Last Qvar prescribed on 12/6/2024    Asthma & COPD Medication Protocol Vilbek41/10/2025 02:38 PM   Protocol Details ACT Score greater than or equal to 20    ACT recorded in the last 12 months    Appointment in past 6 or next 3 months    Medication is active on med list       To be filled at: [id number] - [facility] [address] [phone], [phone]       No future appointments.

## 2025-03-28 RX ORDER — ALBUTEROL SULFATE 90 UG/1
2 INHALANT RESPIRATORY (INHALATION) EVERY 4 HOURS PRN
Qty: 8.5 G | Refills: 0 | Status: SHIPPED | OUTPATIENT
Start: 2025-03-28

## 2025-03-28 NOTE — TELEPHONE ENCOUNTER
Asthma & COPD Medication Protocol [id number]/27/2025 07:40 PM   Protocol Details ACT Score greater than or equal to 20    Appointment in past 6 or next 3 months    ACT recorded in the last 12 months    Medication is active on med list   Last office visit 9/4/24  Last refilled on 12/26/24 for # 1 with 0 refills  No future appointments.     Thank you.

## 2025-03-31 DIAGNOSIS — J45.30 MILD PERSISTENT REACTIVE AIRWAY DISEASE WITHOUT COMPLICATION (HCC): ICD-10-CM

## 2025-04-01 RX ORDER — BECLOMETHASONE DIPROPIONATE HFA 40 UG/1
2 AEROSOL, METERED RESPIRATORY (INHALATION) DAILY
Qty: 10.6 G | Refills: 0 | Status: SHIPPED | OUTPATIENT
Start: 2025-04-01

## 2025-05-05 DIAGNOSIS — J45.30 MILD PERSISTENT REACTIVE AIRWAY DISEASE WITHOUT COMPLICATION (HCC): ICD-10-CM

## 2025-05-05 RX ORDER — BECLOMETHASONE DIPROPIONATE HFA 40 UG/1
2 AEROSOL, METERED RESPIRATORY (INHALATION) DAILY
Qty: 10.6 G | Refills: 0 | Status: SHIPPED | OUTPATIENT
Start: 2025-05-05

## 2025-05-05 NOTE — TELEPHONE ENCOUNTER
Does not pass protocol    Last office visit: 9/4/24 with recommended annual follow up    No future appointments.    Last filled: 4/1/25 Qty 10.6 g R 0    Last labs: 1/31/25

## 2025-05-22 ENCOUNTER — TELEPHONE (OUTPATIENT)
Dept: FAMILY MEDICINE CLINIC | Facility: CLINIC | Age: 50
End: 2025-05-22

## 2025-07-11 ENCOUNTER — OFFICE VISIT (OUTPATIENT)
Dept: FAMILY MEDICINE CLINIC | Facility: CLINIC | Age: 50
End: 2025-07-11
Payer: COMMERCIAL

## 2025-07-11 VITALS
RESPIRATION RATE: 18 BRPM | SYSTOLIC BLOOD PRESSURE: 124 MMHG | OXYGEN SATURATION: 98 % | DIASTOLIC BLOOD PRESSURE: 80 MMHG | HEART RATE: 78 BPM | TEMPERATURE: 98 F

## 2025-07-11 DIAGNOSIS — H00.014 HORDEOLUM OF LEFT UPPER EYELID, UNSPECIFIED HORDEOLUM TYPE: Primary | ICD-10-CM

## 2025-07-11 PROCEDURE — 3074F SYST BP LT 130 MM HG: CPT | Performed by: NURSE PRACTITIONER

## 2025-07-11 PROCEDURE — 3079F DIAST BP 80-89 MM HG: CPT | Performed by: NURSE PRACTITIONER

## 2025-07-11 PROCEDURE — 99213 OFFICE O/P EST LOW 20 MIN: CPT | Performed by: NURSE PRACTITIONER

## 2025-07-11 RX ORDER — ERYTHROMYCIN 5 MG/G
1 OINTMENT OPHTHALMIC EVERY 6 HOURS
Qty: 3.5 G | Refills: 0 | Status: SHIPPED | OUTPATIENT
Start: 2025-07-11 | End: 2025-07-18

## 2025-07-11 NOTE — PATIENT INSTRUCTIONS
Warm compress for 10 minutes 2-4 times daily  Medication as prescribed  Follow up for any new/ worsening symptoms  Follow up with ophthalmology if not improving over the next week.

## 2025-07-11 NOTE — PROGRESS NOTES
CHIEF COMPLAINT:     Chief Complaint   Patient presents with    Eye Problem     eye feels swollen and slightly sore. - Entered by patient       HPI:   Irma Ortez is a 50 year old female who presents with chief complaint of swelling to left upper eyelid. Sxs started 2 days ago. No drainage. No vision changes. No eye redness.     Current Medications[1]   Past Medical History[2]   Past Surgical History[3]   Family History[4]   Short Social Hx on File[5]      REVIEW OF SYSTEMS:   GENERAL: feels well otherwise  SKIN: no rashes  EYES:denies blurred vision or double vision. See HPI  HENT: denies ear pain, congestion, sore throat  LUNGS: denies shortness of breath or cough  CARDIOVASCULAR: denies chest pain or palpitations   GI: denies N/V/C or abdominal pain  NEURO: denies headaches     EXAM:   /80   Pulse 78   Temp 97.6 °F (36.4 °C) (Temporal)   Resp 18   LMP 06/30/2025 (Exact Date)   SpO2 98%   GENERAL: well developed, well nourished,in no apparent distress  SKIN: no rashes,no suspicious lesions  EYES: PERRLA, EOMI, bilateral  conjunctiva non erythematous, no discharge. Small hordeolum noted to left upper lid. This area is slightly tender to touch. No erythema, no drainage  HENT: atraumatic, normocephalic,ears and throat are clear  NECK: supple, non tender  LUNGS: clear to auscultation bilaterally.   CARDIO: RRR without murmur  LYMPH: no preauricular lymphadenopathy. No cervical lymphadenopathy    ASSESSMENT AND PLAN:   Irma Ortez is a 50 year old female who presents with:    ASSESSMENT:   Encounter Diagnosis   Name Primary?    Hordeolum of left upper eyelid, unspecified hordeolum type Yes       PLAN: Warm compresses.  Medication as listed below. Follow up with ophthalmology if not improving the next week.      Requested Prescriptions     Signed Prescriptions Disp Refills    erythromycin 5 MG/GM Ophthalmic Ointment 3.5 g 0     Sig: Place 1 Application into the left eye every 6 (six) hours for 7  days.       Risks, benefits, complications and side effects of meds discussed.      Patient Instructions   Warm compress for 10 minutes 2-4 times daily  Medication as prescribed  Follow up for any new/ worsening symptoms  Follow up with ophthalmology if not improving over the next week.     Call or return if not improved in 2-3 days.  The patient is asked to follow up with their PCP prn.         [1]   Current Outpatient Medications   Medication Sig Dispense Refill    QVAR REDIHALER 40 MCG/ACT Inhalation Aerosol, Breath Activated INHALE 2 PUFFS INTO THE LUNGS DAILY. 10.6 g 0    ALBUTEROL 108 (90 Base) MCG/ACT Inhalation Aero Soln INHALE 2 PUFFS BY MOUTH INTO THE LUNGS EVERY 4 HOURS AS NEEDED 8.5 g 0    Budesonide (PULMICORT FLEXHALER) 90 MCG/ACT Inhalation Aerosol Powder, Breath Activated Inhale 2 puffs into the lungs once daily. 1 each 0    OLMESARTAN MEDOXOMIL-HCTZ 20-12.5 MG Oral Tab TAKE 1 TABLET DAILY 90 tablet 3    escitalopram 5 MG Oral Tab Take 1 tablet (5 mg total) by mouth daily. 30 tablet 0    fluticasone propionate 50 MCG/ACT Nasal Suspension 2 sprays by Each Nare route daily. 1 each 0    Norethindrone 0.35 MG Oral Tab Take 1 tablet (0.35 mg total) by mouth daily.      ALPRAZolam (XANAX) 0.25 MG Oral Tab Take 1 tablet (0.25 mg total) by mouth daily as needed for Anxiety. 5 tablet 0    Nebulizer Does not apply Misc Nebulizer and tubing 1 each 0    Ferrous Sulfate (SLOW FE OR) Take by mouth. (Patient not taking: Reported on 9/4/2024)      Loratadine (CLARITIN OR)       Cholecalciferol (VITAMIN D) 50 MCG (2000 UT) Oral Tab Take by mouth.     [2]   Past Medical History:   Abnormal Pap smear of cervix    Acute bronchitis    Allergic rhinitis, cause unspecified    Anemia    changed on 3/3/11 to Iron Deficiency Anemia    Anxiety state    Backache, unspecified    DIABETES    Diverticulosis of large intestine    Dysfunction of eustachian tube    Essential hypertension, benign    Generalized anxiety disorder     High blood pressure    HYPERTENSION    Obesity    Other malaise and fatigue    Other symptoms referable to back    Premenstrual tension syndromes    Sleep apnea    Sprain of lumbar region    Unspecified essential hypertension    in pregnancy   [3]   Past Surgical History:  Procedure Laterality Date          in  and    [4]   Family History  Problem Relation Age of Onset    Diabetes Mother     Heart Disease Maternal Grandmother         maternal grandmother  from CHF    Diabetes Maternal Grandmother     Diabetes Other    [5]   Social History  Socioeconomic History    Marital status:     Number of children: 2   Occupational History    Occupation:    Tobacco Use    Smoking status: Never    Smokeless tobacco: Never    Tobacco comments:     non-smoker   Vaping Use    Vaping status: Never Used   Substance and Sexual Activity    Alcohol use: Yes     Alcohol/week: 0.0 standard drinks of alcohol     Comment: RARE    Drug use: No     Comment: no    Sexual activity: Yes     Partners: Male     Birth control/protection: Vasectomy   Other Topics Concern    Caffeine Concern Yes     Comment: 3 cups coffee/soda    Exercise No   Social History Narrative    Irma lives with her spouse. She lives at home in [place]. She is  to Primitivo with 2 children. Primitivo is in good health, has asthma. Irma's Holiness is [employer]. Additional social history reported by Irma is: Irma was born in [place], [place]

## 2025-08-02 ENCOUNTER — HOSPITAL ENCOUNTER (EMERGENCY)
Age: 50
Discharge: HOME | End: 2025-08-02
Attending: EMERGENCY MEDICINE

## 2025-08-02 VITALS
RESPIRATION RATE: 18 BRPM | OXYGEN SATURATION: 99 % | BODY MASS INDEX: 46.95 KG/M2 | WEIGHT: 265 LBS | SYSTOLIC BLOOD PRESSURE: 119 MMHG | HEART RATE: 83 BPM | TEMPERATURE: 98 F | HEIGHT: 63 IN | DIASTOLIC BLOOD PRESSURE: 71 MMHG

## 2025-08-02 DIAGNOSIS — L03.90 CELLULITIS, UNSPECIFIED CELLULITIS SITE: Primary | ICD-10-CM

## 2025-08-02 PROCEDURE — 99283 EMERGENCY DEPT VISIT LOW MDM: CPT

## 2025-08-02 PROCEDURE — 99284 EMERGENCY DEPT VISIT MOD MDM: CPT

## 2025-08-02 RX ORDER — CEPHALEXIN 500 MG/1
500 CAPSULE ORAL 3 TIMES DAILY
Qty: 15 CAPSULE | Refills: 0 | Status: SHIPPED | OUTPATIENT
Start: 2025-08-02 | End: 2025-08-07

## 2025-08-26 ENCOUNTER — TELEPHONE (OUTPATIENT)
Dept: FAMILY MEDICINE CLINIC | Facility: CLINIC | Age: 50
End: 2025-08-26

## (undated) NOTE — MR AVS SNAPSHOT
[address] [facility] [address] [phone]               Thank you for choosing us for your health care visit with Kerri Bernardo DO.   We are glad to serve you and happy to provide you with this summary of your vi - ibuprofen 800 MG Tabs  - lisinopril 20 MG Tabs            Results of Recent Testing       MyChart     Visit MyChart  You can access your MyChart to more actively manage your health care and view more details from this visit by going to [email or web address]

## (undated) NOTE — LETTER
ASTHMA ACTION PLAN for Irma Ortez     : 1975     Date: 01/10/24  Doctor:  LAILA Reddy  Phone for doctor or clinic: [facility], [place] [address], [place] [id number] [address] [address] [phone]      ACT Score: 22    ACT Goal: 20 or greater    Call your provider if you require your rescue/quick reliever medication more than 2-3 times in a 24 hour period.    If you require your rescue inhaler/medication more than 2-3 times weekly, your asthma may not be under proper control and you should seek medical attention.    *Quick Relievers are Xopenex and Albuterol*    You can use the colors of a traffic light to help learn about your asthma medicines.  Year Round       1. Green - Go! % of Personal Best Peak Flow   Use controller medicine.   Breathing is good  No cough or wheeze  Can work and play Medicine How much to take When to take it    Medications       Steroid Inhalants Instructions     Beclomethasone Diprop HFA (QVAR REDIHALER) 40 MCG/ACT Inhalation Aerosol, Breath Activated Inhale 2 puffs into the lungs daily.       Sympathomimetics Instructions     albuterol (PROAIR HFA) 108 (90 Base) MCG/ACT Inhalation Aero Soln Inhale 2 puffs into the lungs every 4 (four) hours as needed.                    2. Yellow - Caution. 50-79% Personal Best Peak Flow  Use reliever medicine to keep an asthma attack from getting bad.   Cough  Quick Relievers  Wheezing  Tight Chest  Wake up at night Medicine How much to take When to take it    If symptoms are not improving in 24-48 hrs, call office for further instructions  Medications       Steroid Inhalants Instructions     Beclomethasone Diprop HFA (QVAR REDIHALER) 40 MCG/ACT Inhalation Aerosol, Breath Activated Inhale 2 puffs into the lungs daily.       Sympathomimetics Instructions     albuterol (PROAIR HFA) 108 (90 Base) MCG/ACT Inhalation Aero Soln Inhale 2 puffs into the lungs every 4 (four) hours as needed.                     3. Red - Stop! Danger! <50% Personal Best Peak Flow  Continue Controller Medications But ADD:   Medicine not helping  Breathing is hard and fast  Nose opens wide  Can't walk  Ribs show  Can't talk well Medicine How much to take When to take it    If your symptoms do not improve in ONE hour -  go to the emergency room or call 911 immediately! If symptoms improve, call office for appointment immediately.    Albuterol inhaler 2 puffs every 20 minutes for three treatments       Don't forget:  Rinse mouth after using inhaler  Use spacer for inhaler  Remember to get your Flu vaccine every fall!    [x] Asthma Action Plan reviewed with the caregiver and patient, and a copy of the plan was given to the patient/caregiver.   [] Asthma Action Plan reviewed with the caregiver and patient on the phone, and copy mailed to patient/caregiver or sent via [employer].     Signatures:   Provider  LAILA Reddy Patient  Irma ANSARI Pérez Caretaker

## (undated) NOTE — LETTER
ASTHMA ACTION PLAN for Mary Daniel     : 1975     Date: 23  Doctor:  Seda Leong MD  Phone for doctor or clinic: [facility] [address] [phone]      ACT Score: 22    ACT Goal: 20 or greater    Call your provider if you require your rescue/quick reliever medication more than 2-3 times in a 24 hour period. If you require your rescue inhaler/medication more than 2-3 times weekly, your asthma may not be under proper control and you should seek medical attention. *Quick Relievers are Xopenex and Albuterol*    You can use the colors of a traffic light to help learn about your asthma medicines. Year Round       1. Green - Go! % of Personal Best Peak Flow   Use controller medicine. Breathing is good  No cough or wheeze  Can work and play Medicine How much to take When to take it    Medications       Steroid Inhalants Instructions     Beclomethasone Diprop HFA 40 MCG/ACT Inhalation Aerosol, Breath Activated    Inhale 40 mcg into the lungs daily. 2 puffs qhs      Sympathomimetics Instructions     BREO ELLIPTA 200-25 MCG/ACT Inhalation Aerosol Powder, Breath Activated    INHALE 1 PUFF INTO THE LUNGS DAILY     Albuterol Sulfate HFA (PROAIR HFA) 108 (90 Base) MCG/ACT Inhalation Aero Soln    Inhale 2 puffs into the lungs every 4 (four) hours as needed. 2. Yellow - Caution. 50-79% Personal Best Peak Flow  Use reliever medicine to keep an asthma attack from getting bad. Cough  Quick Relievers  Wheezing  Tight Chest  Wake up at night Medicine How much to take When to take it    If symptoms are not improving in 24-48 hrs, call office for further instructions  Medications       Steroid Inhalants Instructions     Beclomethasone Diprop HFA 40 MCG/ACT Inhalation Aerosol, Breath Activated    Inhale 40 mcg into the lungs daily.  2 puffs qhs      Sympathomimetics Instructions     BREO ELLIPTA 200-25 MCG/ACT Inhalation Aerosol Powder, Breath Activated    INHALE 1 PUFF INTO THE LUNGS DAILY     Albuterol Sulfate HFA (PROAIR HFA) 108 (90 Base) MCG/ACT Inhalation Aero Soln    Inhale 2 puffs into the lungs every 4 (four) hours as needed. 3. Red - Stop! Danger! <50% Personal Best Peak Flow  Continue Controller Medications But ADD:   Medicine not helping  Breathing is hard and fast  Nose opens wide  Can't walk  Ribs show  Can't talk well Medicine How much to take When to take it    If your symptoms do not improve in ONE hour -  go to the emergency room or call 911 immediately! If symptoms improve, call office for appointment immediately. Albuterol inhaler 2 puffs every 20 minutes for three treatments       Don't forget:  Rinse mouth after using inhaler  Use spacer for inhaler  Remember to get your Flu vaccine every fall! [x] Asthma Action Plan reviewed with the caregiver and patient, and a copy of the plan was given to the patient/caregiver. [] Asthma Action Plan reviewed with the caregiver and patient on the phone, and copy mailed to patient/caregiver or sent via [employer]      Signatures:   Provider  Jorge Mcnally MD Patient  Shreya Boucher

## (undated) NOTE — MR AVS SNAPSHOT
[address] [facility] [address] [phone]               Thank you for choosing us for your health care visit with Yumiko Roger DO.   We are glad to serve you and happy to provide you with this summary of your vi For medical emergencies, dial 911. Educational Information     Healthy Diet and Regular Exercise  [employer] for making healthy food choices  -   Enjoy your food, but eat less. Fully enjoy your food when eating.    Don’t

## (undated) NOTE — LETTER
09/28/21        Melly [place] [address]      Dear Patient,    IF YOU ARE 48YEARS OF AGE OR OLDER, COLORECTAL CANCER SCREENING IS RECOMMENDED AND COULD SAVE YOUR LIFE.      As your primary care doctor, I want to share with